# Patient Record
Sex: FEMALE | Race: WHITE | NOT HISPANIC OR LATINO | Employment: OTHER | URBAN - NONMETROPOLITAN AREA
[De-identification: names, ages, dates, MRNs, and addresses within clinical notes are randomized per-mention and may not be internally consistent; named-entity substitution may affect disease eponyms.]

---

## 2017-04-20 ENCOUNTER — LAB (OUTPATIENT)
Dept: LAB | Facility: HOSPITAL | Age: 57
End: 2017-04-20
Attending: INTERNAL MEDICINE

## 2017-04-20 ENCOUNTER — TRANSCRIBE ORDERS (OUTPATIENT)
Dept: ADMINISTRATIVE | Facility: HOSPITAL | Age: 57
End: 2017-04-20

## 2017-04-20 DIAGNOSIS — R53.83 MALAISE AND FATIGUE: ICD-10-CM

## 2017-04-20 DIAGNOSIS — E78.5 HYPERLIPIDEMIA, UNSPECIFIED HYPERLIPIDEMIA TYPE: ICD-10-CM

## 2017-04-20 DIAGNOSIS — R53.81 MALAISE AND FATIGUE: ICD-10-CM

## 2017-04-20 DIAGNOSIS — E11.9 DIABETES MELLITUS WITHOUT COMPLICATION (HCC): ICD-10-CM

## 2017-04-20 DIAGNOSIS — I10 BENIGN HYPERTENSION: ICD-10-CM

## 2017-04-20 DIAGNOSIS — E55.9 UNSPECIFIED VITAMIN D DEFICIENCY: Primary | ICD-10-CM

## 2017-04-20 DIAGNOSIS — E55.9 UNSPECIFIED VITAMIN D DEFICIENCY: ICD-10-CM

## 2017-04-20 LAB
25(OH)D3 SERPL-MCNC: 46 NG/ML
ALBUMIN SERPL-MCNC: 4.3 G/DL (ref 3.5–5)
ALBUMIN/GLOB SERPL: 1.4 G/DL (ref 1.5–2.5)
ALP SERPL-CCNC: 129 U/L (ref 35–104)
ALT SERPL W P-5'-P-CCNC: 50 U/L (ref 10–36)
ANION GAP SERPL CALCULATED.3IONS-SCNC: 6.2 MMOL/L (ref 3.6–11.2)
AST SERPL-CCNC: 35 U/L (ref 10–30)
BASOPHILS # BLD AUTO: 0.03 10*3/MM3 (ref 0–0.3)
BASOPHILS NFR BLD AUTO: 0.4 % (ref 0–2)
BILIRUB SERPL-MCNC: 0.4 MG/DL (ref 0.2–1.8)
BUN BLD-MCNC: 10 MG/DL (ref 7–21)
BUN/CREAT SERPL: 13.9 (ref 7–25)
CALCIUM SPEC-SCNC: 9.8 MG/DL (ref 7.7–10)
CHLORIDE SERPL-SCNC: 105 MMOL/L (ref 99–112)
CHOLEST SERPL-MCNC: 202 MG/DL (ref 0–200)
CO2 SERPL-SCNC: 30.8 MMOL/L (ref 24.3–31.9)
CREAT BLD-MCNC: 0.72 MG/DL (ref 0.43–1.29)
DEPRECATED RDW RBC AUTO: 40.5 FL (ref 37–54)
EOSINOPHIL # BLD AUTO: 0.14 10*3/MM3 (ref 0–0.7)
EOSINOPHIL NFR BLD AUTO: 1.9 % (ref 0–5)
ERYTHROCYTE [DISTWIDTH] IN BLOOD BY AUTOMATED COUNT: 13.1 % (ref 11.5–14.5)
GFR SERPL CREATININE-BSD FRML MDRD: 83 ML/MIN/1.73
GLOBULIN UR ELPH-MCNC: 3.1 GM/DL
GLUCOSE BLD-MCNC: 244 MG/DL (ref 70–110)
HBA1C MFR BLD: 7.8 % (ref 4.5–5.7)
HCT VFR BLD AUTO: 44.6 % (ref 37–47)
HDLC SERPL-MCNC: 51 MG/DL (ref 60–100)
HGB BLD-MCNC: 13.7 G/DL (ref 12–16)
IMM GRANULOCYTES # BLD: 0.09 10*3/MM3 (ref 0–0.03)
IMM GRANULOCYTES NFR BLD: 1.2 % (ref 0–0.5)
LDLC SERPL CALC-MCNC: 105 MG/DL (ref 0–100)
LDLC/HDLC SERPL: 2.05 {RATIO}
LYMPHOCYTES # BLD AUTO: 1.5 10*3/MM3 (ref 1–3)
LYMPHOCYTES NFR BLD AUTO: 19.9 % (ref 21–51)
MCH RBC QN AUTO: 26.1 PG (ref 27–33)
MCHC RBC AUTO-ENTMCNC: 30.7 G/DL (ref 33–37)
MCV RBC AUTO: 85.1 FL (ref 80–94)
MONOCYTES # BLD AUTO: 0.63 10*3/MM3 (ref 0.1–0.9)
MONOCYTES NFR BLD AUTO: 8.4 % (ref 0–10)
NEUTROPHILS # BLD AUTO: 5.13 10*3/MM3 (ref 1.4–6.5)
NEUTROPHILS NFR BLD AUTO: 68.2 % (ref 30–70)
OSMOLALITY SERPL CALC.SUM OF ELEC: 290.2 MOSM/KG (ref 273–305)
PLATELET # BLD AUTO: 260 10*3/MM3 (ref 130–400)
PMV BLD AUTO: 11.2 FL (ref 6–10)
POTASSIUM BLD-SCNC: 4.5 MMOL/L (ref 3.5–5.3)
PROT SERPL-MCNC: 7.4 G/DL (ref 6–8)
RBC # BLD AUTO: 5.24 10*6/MM3 (ref 4.2–5.4)
SODIUM BLD-SCNC: 142 MMOL/L (ref 135–153)
TRIGL SERPL-MCNC: 231 MG/DL (ref 0–150)
VLDLC SERPL-MCNC: 46.2 MG/DL
WBC NRBC COR # BLD: 7.52 10*3/MM3 (ref 4.5–12.5)

## 2017-04-20 PROCEDURE — 85025 COMPLETE CBC W/AUTO DIFF WBC: CPT

## 2017-04-20 PROCEDURE — 80053 COMPREHEN METABOLIC PANEL: CPT

## 2017-04-20 PROCEDURE — 80061 LIPID PANEL: CPT

## 2017-04-20 PROCEDURE — 36415 COLL VENOUS BLD VENIPUNCTURE: CPT

## 2017-04-20 PROCEDURE — 83036 HEMOGLOBIN GLYCOSYLATED A1C: CPT

## 2017-04-20 PROCEDURE — 82306 VITAMIN D 25 HYDROXY: CPT

## 2017-07-12 ENCOUNTER — TRANSCRIBE ORDERS (OUTPATIENT)
Dept: ADMINISTRATIVE | Facility: HOSPITAL | Age: 57
End: 2017-07-12

## 2017-07-12 DIAGNOSIS — Z12.31 VISIT FOR SCREENING MAMMOGRAM: Primary | ICD-10-CM

## 2017-07-31 ENCOUNTER — LAB (OUTPATIENT)
Dept: LAB | Facility: HOSPITAL | Age: 57
End: 2017-07-31
Attending: INTERNAL MEDICINE

## 2017-07-31 ENCOUNTER — TRANSCRIBE ORDERS (OUTPATIENT)
Dept: ADMINISTRATIVE | Facility: HOSPITAL | Age: 57
End: 2017-07-31

## 2017-07-31 DIAGNOSIS — R53.81 MALAISE AND FATIGUE: ICD-10-CM

## 2017-07-31 DIAGNOSIS — R79.89 OTHER ABNORMAL BLOOD CHEMISTRY: Primary | ICD-10-CM

## 2017-07-31 DIAGNOSIS — R79.89 OTHER ABNORMAL BLOOD CHEMISTRY: ICD-10-CM

## 2017-07-31 DIAGNOSIS — I10 BENIGN HYPERTENSION: ICD-10-CM

## 2017-07-31 DIAGNOSIS — R53.83 MALAISE AND FATIGUE: ICD-10-CM

## 2017-07-31 DIAGNOSIS — E11.9 DIABETES MELLITUS WITHOUT COMPLICATION (HCC): ICD-10-CM

## 2017-07-31 DIAGNOSIS — E78.5 HYPERLIPIDEMIA, UNSPECIFIED HYPERLIPIDEMIA TYPE: ICD-10-CM

## 2017-07-31 DIAGNOSIS — E55.9 UNSPECIFIED VITAMIN D DEFICIENCY: ICD-10-CM

## 2017-07-31 LAB
ALBUMIN SERPL-MCNC: 4.5 G/DL (ref 3.5–5)
ALBUMIN/GLOB SERPL: 1.4 G/DL (ref 1.5–2.5)
ALP SERPL-CCNC: 118 U/L (ref 35–104)
ALT SERPL W P-5'-P-CCNC: 35 U/L (ref 10–36)
ANION GAP SERPL CALCULATED.3IONS-SCNC: 2.9 MMOL/L (ref 3.6–11.2)
AST SERPL-CCNC: 24 U/L (ref 10–30)
BILIRUB SERPL-MCNC: 0.3 MG/DL (ref 0.2–1.8)
BUN BLD-MCNC: 15 MG/DL (ref 7–21)
BUN/CREAT SERPL: 22.1 (ref 7–25)
CALCIUM SPEC-SCNC: 9.8 MG/DL (ref 7.7–10)
CHLORIDE SERPL-SCNC: 109 MMOL/L (ref 99–112)
CHOLEST SERPL-MCNC: 156 MG/DL (ref 0–200)
CO2 SERPL-SCNC: 30.1 MMOL/L (ref 24.3–31.9)
CREAT BLD-MCNC: 0.68 MG/DL (ref 0.43–1.29)
GFR SERPL CREATININE-BSD FRML MDRD: 89 ML/MIN/1.73
GLOBULIN UR ELPH-MCNC: 3.3 GM/DL
GLUCOSE BLD-MCNC: 159 MG/DL (ref 70–110)
HBV SURFACE AB SER RIA-ACNC: NORMAL
HBV SURFACE AG SERPL QL IA: NORMAL
HCV AB SER DONR QL: NORMAL
HDLC SERPL-MCNC: 44 MG/DL (ref 60–100)
LDLC SERPL CALC-MCNC: 80 MG/DL (ref 0–100)
LDLC/HDLC SERPL: 1.81 {RATIO}
OSMOLALITY SERPL CALC.SUM OF ELEC: 287.3 MOSM/KG (ref 273–305)
POTASSIUM BLD-SCNC: 4.2 MMOL/L (ref 3.5–5.3)
PROT SERPL-MCNC: 7.8 G/DL (ref 6–8)
SODIUM BLD-SCNC: 142 MMOL/L (ref 135–153)
TRIGL SERPL-MCNC: 162 MG/DL (ref 0–150)
VLDLC SERPL-MCNC: 32.4 MG/DL

## 2017-07-31 PROCEDURE — 84681 ASSAY OF C-PEPTIDE: CPT | Performed by: INTERNAL MEDICINE

## 2017-07-31 PROCEDURE — 86803 HEPATITIS C AB TEST: CPT | Performed by: INTERNAL MEDICINE

## 2017-07-31 PROCEDURE — 86706 HEP B SURFACE ANTIBODY: CPT | Performed by: INTERNAL MEDICINE

## 2017-07-31 PROCEDURE — 80061 LIPID PANEL: CPT | Performed by: INTERNAL MEDICINE

## 2017-07-31 PROCEDURE — 80053 COMPREHEN METABOLIC PANEL: CPT | Performed by: INTERNAL MEDICINE

## 2017-07-31 PROCEDURE — 87340 HEPATITIS B SURFACE AG IA: CPT | Performed by: INTERNAL MEDICINE

## 2017-07-31 PROCEDURE — 36415 COLL VENOUS BLD VENIPUNCTURE: CPT

## 2017-08-01 LAB — C PEPTIDE SERPL-MCNC: 3.8 NG/ML (ref 1.1–4.4)

## 2017-08-08 ENCOUNTER — TRANSCRIBE ORDERS (OUTPATIENT)
Dept: ADMINISTRATIVE | Facility: HOSPITAL | Age: 57
End: 2017-08-08

## 2017-08-08 DIAGNOSIS — R79.89 ABNORMAL LFTS: Primary | ICD-10-CM

## 2017-08-08 DIAGNOSIS — R10.9 ABDOMINAL PAIN IN FEMALE: ICD-10-CM

## 2017-08-24 ENCOUNTER — HOSPITAL ENCOUNTER (OUTPATIENT)
Dept: CT IMAGING | Facility: HOSPITAL | Age: 57
Discharge: HOME | End: 2017-08-24
Attending: INTERNAL MEDICINE | Admitting: INTERNAL MEDICINE

## 2017-08-24 DIAGNOSIS — R79.89 ABNORMAL LFTS: ICD-10-CM

## 2017-08-24 DIAGNOSIS — R10.9 ABDOMINAL PAIN IN FEMALE: ICD-10-CM

## 2017-08-24 LAB — CREAT BLDA-MCNC: 0.6 MG/DL (ref 0.6–1.3)

## 2017-08-24 PROCEDURE — 74177 CT ABD & PELVIS W/CONTRAST: CPT

## 2017-08-24 PROCEDURE — 74177 CT ABD & PELVIS W/CONTRAST: CPT | Performed by: RADIOLOGY

## 2017-08-24 PROCEDURE — 82565 ASSAY OF CREATININE: CPT

## 2017-08-24 PROCEDURE — 0 IOPAMIDOL 61 % SOLUTION: Performed by: INTERNAL MEDICINE

## 2017-08-24 RX ADMIN — IOPAMIDOL 100 ML: 612 INJECTION, SOLUTION INTRAVENOUS at 08:35

## 2017-09-06 ENCOUNTER — HOSPITAL ENCOUNTER (OUTPATIENT)
Dept: MAMMOGRAPHY | Facility: HOSPITAL | Age: 57
Discharge: HOME | End: 2017-09-06
Admitting: INTERNAL MEDICINE

## 2017-09-06 DIAGNOSIS — Z12.31 VISIT FOR SCREENING MAMMOGRAM: ICD-10-CM

## 2017-09-06 PROCEDURE — 77063 BREAST TOMOSYNTHESIS BI: CPT | Performed by: RADIOLOGY

## 2017-09-06 PROCEDURE — G0202 SCR MAMMO BI INCL CAD: HCPCS

## 2017-09-06 PROCEDURE — 77063 BREAST TOMOSYNTHESIS BI: CPT

## 2017-09-06 PROCEDURE — 77067 SCR MAMMO BI INCL CAD: CPT | Performed by: RADIOLOGY

## 2017-12-07 ENCOUNTER — TRANSCRIBE ORDERS (OUTPATIENT)
Dept: ADMINISTRATIVE | Facility: HOSPITAL | Age: 57
End: 2017-12-07

## 2017-12-07 ENCOUNTER — LAB (OUTPATIENT)
Dept: LAB | Facility: HOSPITAL | Age: 57
End: 2017-12-07
Attending: INTERNAL MEDICINE

## 2017-12-07 DIAGNOSIS — R53.81 MALAISE AND FATIGUE: ICD-10-CM

## 2017-12-07 DIAGNOSIS — E78.5 DYSLIPIDEMIA: ICD-10-CM

## 2017-12-07 DIAGNOSIS — R53.83 MALAISE AND FATIGUE: ICD-10-CM

## 2017-12-07 DIAGNOSIS — I10 BENIGN HYPERTENSION: ICD-10-CM

## 2017-12-07 DIAGNOSIS — E11.69 DIABETES MELLITUS ASSOCIATED WITH HORMONAL ETIOLOGY (HCC): ICD-10-CM

## 2017-12-07 DIAGNOSIS — E11.69 DIABETES MELLITUS ASSOCIATED WITH HORMONAL ETIOLOGY (HCC): Primary | ICD-10-CM

## 2017-12-07 LAB
ALBUMIN SERPL-MCNC: 4.5 G/DL (ref 3.5–5)
ALBUMIN/GLOB SERPL: 1.5 G/DL (ref 1.5–2.5)
ALP SERPL-CCNC: 120 U/L (ref 35–104)
ALT SERPL W P-5'-P-CCNC: 28 U/L (ref 10–36)
ANION GAP SERPL CALCULATED.3IONS-SCNC: 8.9 MMOL/L (ref 3.6–11.2)
AST SERPL-CCNC: 18 U/L (ref 10–30)
BASOPHILS # BLD AUTO: 0.02 10*3/MM3 (ref 0–0.3)
BASOPHILS NFR BLD AUTO: 0.3 % (ref 0–2)
BILIRUB SERPL-MCNC: 0.4 MG/DL (ref 0.2–1.8)
BUN BLD-MCNC: 13 MG/DL (ref 7–21)
BUN/CREAT SERPL: 17.6 (ref 7–25)
CALCIUM SPEC-SCNC: 9.7 MG/DL (ref 7.7–10)
CHLORIDE SERPL-SCNC: 105 MMOL/L (ref 99–112)
CHOLEST SERPL-MCNC: 170 MG/DL (ref 0–200)
CO2 SERPL-SCNC: 29.1 MMOL/L (ref 24.3–31.9)
CREAT BLD-MCNC: 0.74 MG/DL (ref 0.43–1.29)
DEPRECATED RDW RBC AUTO: 43.1 FL (ref 37–54)
EOSINOPHIL # BLD AUTO: 0.19 10*3/MM3 (ref 0–0.7)
EOSINOPHIL NFR BLD AUTO: 2.6 % (ref 0–5)
ERYTHROCYTE [DISTWIDTH] IN BLOOD BY AUTOMATED COUNT: 14.2 % (ref 11.5–14.5)
GFR SERPL CREATININE-BSD FRML MDRD: 80 ML/MIN/1.73
GLOBULIN UR ELPH-MCNC: 3.1 GM/DL
GLUCOSE BLD-MCNC: 146 MG/DL (ref 70–110)
HBA1C MFR BLD: 7.9 % (ref 4.5–5.7)
HCT VFR BLD AUTO: 45.8 % (ref 37–47)
HDLC SERPL-MCNC: 46 MG/DL (ref 60–100)
HGB BLD-MCNC: 13.9 G/DL (ref 12–16)
IMM GRANULOCYTES # BLD: 0.05 10*3/MM3 (ref 0–0.03)
IMM GRANULOCYTES NFR BLD: 0.7 % (ref 0–0.5)
LDLC SERPL CALC-MCNC: 94 MG/DL (ref 0–100)
LDLC/HDLC SERPL: 2.04 {RATIO}
LYMPHOCYTES # BLD AUTO: 1.78 10*3/MM3 (ref 1–3)
LYMPHOCYTES NFR BLD AUTO: 24.5 % (ref 21–51)
MCH RBC QN AUTO: 25.2 PG (ref 27–33)
MCHC RBC AUTO-ENTMCNC: 30.3 G/DL (ref 33–37)
MCV RBC AUTO: 83 FL (ref 80–94)
MONOCYTES # BLD AUTO: 0.71 10*3/MM3 (ref 0.1–0.9)
MONOCYTES NFR BLD AUTO: 9.8 % (ref 0–10)
NEUTROPHILS # BLD AUTO: 4.51 10*3/MM3 (ref 1.4–6.5)
NEUTROPHILS NFR BLD AUTO: 62.1 % (ref 30–70)
OSMOLALITY SERPL CALC.SUM OF ELEC: 287.7 MOSM/KG (ref 273–305)
PLATELET # BLD AUTO: 258 10*3/MM3 (ref 130–400)
PMV BLD AUTO: 11.4 FL (ref 6–10)
POTASSIUM BLD-SCNC: 4.5 MMOL/L (ref 3.5–5.3)
PROT SERPL-MCNC: 7.6 G/DL (ref 6–8)
RBC # BLD AUTO: 5.52 10*6/MM3 (ref 4.2–5.4)
SODIUM BLD-SCNC: 143 MMOL/L (ref 135–153)
TRIGL SERPL-MCNC: 151 MG/DL (ref 0–150)
VLDLC SERPL-MCNC: 30.2 MG/DL
WBC NRBC COR # BLD: 7.26 10*3/MM3 (ref 4.5–12.5)

## 2017-12-07 PROCEDURE — 83036 HEMOGLOBIN GLYCOSYLATED A1C: CPT

## 2017-12-07 PROCEDURE — 80053 COMPREHEN METABOLIC PANEL: CPT

## 2017-12-07 PROCEDURE — 80061 LIPID PANEL: CPT

## 2017-12-07 PROCEDURE — 85025 COMPLETE CBC W/AUTO DIFF WBC: CPT

## 2017-12-07 PROCEDURE — 36415 COLL VENOUS BLD VENIPUNCTURE: CPT

## 2018-03-29 ENCOUNTER — LAB (OUTPATIENT)
Dept: LAB | Facility: HOSPITAL | Age: 58
End: 2018-03-29
Attending: INTERNAL MEDICINE

## 2018-03-29 ENCOUNTER — TRANSCRIBE ORDERS (OUTPATIENT)
Dept: ADMINISTRATIVE | Facility: HOSPITAL | Age: 58
End: 2018-03-29

## 2018-03-29 DIAGNOSIS — E78.5 HYPERLIPIDEMIA, UNSPECIFIED HYPERLIPIDEMIA TYPE: ICD-10-CM

## 2018-03-29 DIAGNOSIS — I10 BENIGN HYPERTENSION: ICD-10-CM

## 2018-03-29 DIAGNOSIS — R53.81 MALAISE AND FATIGUE: ICD-10-CM

## 2018-03-29 DIAGNOSIS — R53.83 MALAISE AND FATIGUE: ICD-10-CM

## 2018-03-29 DIAGNOSIS — E11.69 DIABETES MELLITUS ASSOCIATED WITH HORMONAL ETIOLOGY (HCC): Primary | ICD-10-CM

## 2018-03-29 DIAGNOSIS — E11.69 DIABETES MELLITUS ASSOCIATED WITH HORMONAL ETIOLOGY (HCC): ICD-10-CM

## 2018-03-29 LAB
ALBUMIN SERPL-MCNC: 4.2 G/DL (ref 3.5–5)
ALBUMIN/GLOB SERPL: 1.4 G/DL (ref 1.5–2.5)
ALP SERPL-CCNC: 116 U/L (ref 35–104)
ALT SERPL W P-5'-P-CCNC: 23 U/L (ref 10–36)
ANION GAP SERPL CALCULATED.3IONS-SCNC: 6.1 MMOL/L (ref 3.6–11.2)
AST SERPL-CCNC: 15 U/L (ref 10–30)
BASOPHILS # BLD AUTO: 0.04 10*3/MM3 (ref 0–0.3)
BASOPHILS NFR BLD AUTO: 0.5 % (ref 0–2)
BILIRUB SERPL-MCNC: 0.3 MG/DL (ref 0.2–1.8)
BUN BLD-MCNC: 16 MG/DL (ref 7–21)
BUN/CREAT SERPL: 26.7 (ref 7–25)
CALCIUM SPEC-SCNC: 9.6 MG/DL (ref 7.7–10)
CHLORIDE SERPL-SCNC: 109 MMOL/L (ref 99–112)
CHOLEST SERPL-MCNC: 168 MG/DL (ref 0–200)
CO2 SERPL-SCNC: 26.9 MMOL/L (ref 24.3–31.9)
CREAT BLD-MCNC: 0.6 MG/DL (ref 0.43–1.29)
DEPRECATED RDW RBC AUTO: 43.1 FL (ref 37–54)
EOSINOPHIL # BLD AUTO: 0.14 10*3/MM3 (ref 0–0.7)
EOSINOPHIL NFR BLD AUTO: 1.9 % (ref 0–5)
ERYTHROCYTE [DISTWIDTH] IN BLOOD BY AUTOMATED COUNT: 14.5 % (ref 11.5–14.5)
GFR SERPL CREATININE-BSD FRML MDRD: 102 ML/MIN/1.73
GLOBULIN UR ELPH-MCNC: 2.9 GM/DL
GLUCOSE BLD-MCNC: 150 MG/DL (ref 70–110)
HBA1C MFR BLD: 7.1 % (ref 4.5–5.7)
HCT VFR BLD AUTO: 45 % (ref 37–47)
HDLC SERPL-MCNC: 52 MG/DL (ref 60–100)
HGB BLD-MCNC: 14.1 G/DL (ref 12–16)
IMM GRANULOCYTES # BLD: 0.07 10*3/MM3 (ref 0–0.03)
IMM GRANULOCYTES NFR BLD: 0.9 % (ref 0–0.5)
LDLC SERPL CALC-MCNC: 78 MG/DL (ref 0–100)
LDLC/HDLC SERPL: 1.5 {RATIO}
LYMPHOCYTES # BLD AUTO: 1.96 10*3/MM3 (ref 1–3)
LYMPHOCYTES NFR BLD AUTO: 26.3 % (ref 21–51)
MCH RBC QN AUTO: 25.7 PG (ref 27–33)
MCHC RBC AUTO-ENTMCNC: 31.3 G/DL (ref 33–37)
MCV RBC AUTO: 82.1 FL (ref 80–94)
MONOCYTES # BLD AUTO: 0.65 10*3/MM3 (ref 0.1–0.9)
MONOCYTES NFR BLD AUTO: 8.7 % (ref 0–10)
NEUTROPHILS # BLD AUTO: 4.58 10*3/MM3 (ref 1.4–6.5)
NEUTROPHILS NFR BLD AUTO: 61.7 % (ref 30–70)
OSMOLALITY SERPL CALC.SUM OF ELEC: 287.2 MOSM/KG (ref 273–305)
PLATELET # BLD AUTO: 256 10*3/MM3 (ref 130–400)
PMV BLD AUTO: 10.8 FL (ref 6–10)
POTASSIUM BLD-SCNC: 4.3 MMOL/L (ref 3.5–5.3)
PROT SERPL-MCNC: 7.1 G/DL (ref 6–8)
RBC # BLD AUTO: 5.48 10*6/MM3 (ref 4.2–5.4)
SODIUM BLD-SCNC: 142 MMOL/L (ref 135–153)
TRIGL SERPL-MCNC: 190 MG/DL (ref 0–150)
VLDLC SERPL-MCNC: 38 MG/DL
WBC NRBC COR # BLD: 7.44 10*3/MM3 (ref 4.5–12.5)

## 2018-03-29 PROCEDURE — 83036 HEMOGLOBIN GLYCOSYLATED A1C: CPT

## 2018-03-29 PROCEDURE — 80053 COMPREHEN METABOLIC PANEL: CPT

## 2018-03-29 PROCEDURE — 36415 COLL VENOUS BLD VENIPUNCTURE: CPT

## 2018-03-29 PROCEDURE — 85025 COMPLETE CBC W/AUTO DIFF WBC: CPT

## 2018-03-29 PROCEDURE — 80061 LIPID PANEL: CPT

## 2018-08-09 ENCOUNTER — LAB (OUTPATIENT)
Dept: LAB | Facility: HOSPITAL | Age: 58
End: 2018-08-09
Attending: INTERNAL MEDICINE

## 2018-08-09 ENCOUNTER — TRANSCRIBE ORDERS (OUTPATIENT)
Dept: ADMINISTRATIVE | Facility: HOSPITAL | Age: 58
End: 2018-08-09

## 2018-08-09 DIAGNOSIS — M25.50 PAIN IN JOINT, MULTIPLE SITES: ICD-10-CM

## 2018-08-09 DIAGNOSIS — E78.5 HYPERLIPIDEMIA, UNSPECIFIED HYPERLIPIDEMIA TYPE: ICD-10-CM

## 2018-08-09 DIAGNOSIS — R94.5 LIVER FUNCTION STUDY, ABNORMAL: ICD-10-CM

## 2018-08-09 DIAGNOSIS — E11.69 DIABETES MELLITUS ASSOCIATED WITH HORMONAL ETIOLOGY (HCC): ICD-10-CM

## 2018-08-09 DIAGNOSIS — I10 BENIGN HYPERTENSION: Primary | ICD-10-CM

## 2018-08-09 DIAGNOSIS — I10 BENIGN HYPERTENSION: ICD-10-CM

## 2018-08-09 DIAGNOSIS — E55.9 VITAMIN D DEFICIENCY: ICD-10-CM

## 2018-08-09 LAB
25(OH)D3 SERPL-MCNC: 79 NG/ML
ALBUMIN UR-MCNC: 3.8 MG/L
CHOLEST SERPL-MCNC: 163 MG/DL (ref 0–200)
CK SERPL-CCNC: 38 U/L (ref 24–173)
HBA1C MFR BLD: 7.5 % (ref 4.5–5.7)
HDLC SERPL-MCNC: 48 MG/DL (ref 60–100)
LDLC SERPL CALC-MCNC: 74 MG/DL (ref 0–100)
LDLC/HDLC SERPL: 1.53 {RATIO}
TRIGL SERPL-MCNC: 207 MG/DL (ref 0–150)
VLDLC SERPL-MCNC: 41.4 MG/DL

## 2018-08-09 PROCEDURE — 80061 LIPID PANEL: CPT

## 2018-08-09 PROCEDURE — 82306 VITAMIN D 25 HYDROXY: CPT

## 2018-08-09 PROCEDURE — 84681 ASSAY OF C-PEPTIDE: CPT

## 2018-08-09 PROCEDURE — 36415 COLL VENOUS BLD VENIPUNCTURE: CPT

## 2018-08-09 PROCEDURE — 83036 HEMOGLOBIN GLYCOSYLATED A1C: CPT

## 2018-08-09 PROCEDURE — 84075 ASSAY ALKALINE PHOSPHATASE: CPT

## 2018-08-09 PROCEDURE — 84080 ASSAY ALKALINE PHOSPHATASES: CPT

## 2018-08-09 PROCEDURE — 82043 UR ALBUMIN QUANTITATIVE: CPT

## 2018-08-09 PROCEDURE — 82550 ASSAY OF CK (CPK): CPT

## 2018-08-10 LAB — C PEPTIDE SERPL-MCNC: 3.8 NG/ML (ref 1.1–4.4)

## 2018-08-13 ENCOUNTER — TRANSCRIBE ORDERS (OUTPATIENT)
Dept: PULMONOLOGY | Facility: HOSPITAL | Age: 58
End: 2018-08-13

## 2018-08-13 DIAGNOSIS — Z12.31 VISIT FOR SCREENING MAMMOGRAM: Primary | ICD-10-CM

## 2018-08-13 LAB
ALP BONE CFR SERPL: 36 % (ref 14–68)
ALP INTEST CFR SERPL: 21 % (ref 0–18)
ALP LIVER CFR SERPL: 43 % (ref 18–85)
ALP SERPL-CCNC: 105 IU/L (ref 39–117)

## 2018-08-23 ENCOUNTER — TRANSCRIBE ORDERS (OUTPATIENT)
Dept: ADMINISTRATIVE | Facility: HOSPITAL | Age: 58
End: 2018-08-23

## 2018-08-23 DIAGNOSIS — R10.9 ABDOMINAL PAIN IN FEMALE: ICD-10-CM

## 2018-08-23 DIAGNOSIS — R94.5 ABNORMAL RESULTS OF LIVER FUNCTION STUDIES: Primary | ICD-10-CM

## 2018-08-30 ENCOUNTER — TRANSCRIBE ORDERS (OUTPATIENT)
Dept: PULMONOLOGY | Facility: HOSPITAL | Age: 58
End: 2018-08-30

## 2018-08-30 ENCOUNTER — HOSPITAL ENCOUNTER (OUTPATIENT)
Dept: ULTRASOUND IMAGING | Facility: HOSPITAL | Age: 58
Discharge: HOME | End: 2018-08-30
Attending: INTERNAL MEDICINE

## 2018-08-30 ENCOUNTER — LAB (OUTPATIENT)
Dept: LAB | Facility: HOSPITAL | Age: 58
End: 2018-08-30
Attending: INTERNAL MEDICINE

## 2018-08-30 ENCOUNTER — HOSPITAL ENCOUNTER (OUTPATIENT)
Dept: NUCLEAR MEDICINE | Facility: HOSPITAL | Age: 58
Discharge: HOME | End: 2018-08-30
Attending: INTERNAL MEDICINE

## 2018-08-30 DIAGNOSIS — R10.9 ABDOMINAL PAIN, UNSPECIFIED ABDOMINAL LOCATION: Primary | ICD-10-CM

## 2018-08-30 DIAGNOSIS — R10.9 ABDOMINAL PAIN IN FEMALE: ICD-10-CM

## 2018-08-30 DIAGNOSIS — R94.5 ABNORMAL RESULTS OF LIVER FUNCTION STUDIES: ICD-10-CM

## 2018-08-30 DIAGNOSIS — R10.9 ABDOMINAL PAIN, UNSPECIFIED ABDOMINAL LOCATION: ICD-10-CM

## 2018-08-30 LAB
AMYLASE SERPL-CCNC: 29 U/L (ref 28–100)
LIPASE SERPL-CCNC: 30 U/L (ref 13–60)

## 2018-08-30 PROCEDURE — 76705 ECHO EXAM OF ABDOMEN: CPT

## 2018-08-30 PROCEDURE — 36415 COLL VENOUS BLD VENIPUNCTURE: CPT

## 2018-08-30 PROCEDURE — 83690 ASSAY OF LIPASE: CPT

## 2018-08-30 PROCEDURE — 76705 ECHO EXAM OF ABDOMEN: CPT | Performed by: RADIOLOGY

## 2018-08-30 PROCEDURE — 82150 ASSAY OF AMYLASE: CPT

## 2018-09-01 ENCOUNTER — HOSPITAL ENCOUNTER (OUTPATIENT)
Dept: NUCLEAR MEDICINE | Facility: HOSPITAL | Age: 58
Discharge: HOME | End: 2018-09-01
Attending: INTERNAL MEDICINE

## 2018-09-01 DIAGNOSIS — R10.9 ABDOMINAL PAIN IN FEMALE: ICD-10-CM

## 2018-09-01 PROCEDURE — 0 TECHNETIUM TC 99M MEBROFENIN KIT: Performed by: INTERNAL MEDICINE

## 2018-09-01 PROCEDURE — 78226 HEPATOBILIARY SYSTEM IMAGING: CPT

## 2018-09-01 PROCEDURE — A9537 TC99M MEBROFENIN: HCPCS | Performed by: INTERNAL MEDICINE

## 2018-09-01 PROCEDURE — 78226 HEPATOBILIARY SYSTEM IMAGING: CPT | Performed by: RADIOLOGY

## 2018-09-01 RX ORDER — KIT FOR THE PREPARATION OF TECHNETIUM TC 99M MEBROFENIN 45 MG/10ML
1 INJECTION, POWDER, LYOPHILIZED, FOR SOLUTION INTRAVENOUS
Status: COMPLETED | OUTPATIENT
Start: 2018-09-01 | End: 2018-09-01

## 2018-09-01 RX ADMIN — MEBROFENIN 1 DOSE: 45 INJECTION, POWDER, LYOPHILIZED, FOR SOLUTION INTRAVENOUS at 12:20

## 2018-09-02 ENCOUNTER — APPOINTMENT (OUTPATIENT)
Dept: NUCLEAR MEDICINE | Facility: HOSPITAL | Age: 58
End: 2018-09-02
Attending: INTERNAL MEDICINE

## 2018-09-05 ENCOUNTER — OFFICE VISIT (OUTPATIENT)
Dept: SURGERY | Facility: CLINIC | Age: 58
End: 2018-09-05

## 2018-09-05 VITALS
BODY MASS INDEX: 30 KG/M2 | DIASTOLIC BLOOD PRESSURE: 83 MMHG | HEART RATE: 85 BPM | WEIGHT: 163 LBS | SYSTOLIC BLOOD PRESSURE: 137 MMHG | HEIGHT: 62 IN

## 2018-09-05 DIAGNOSIS — K76.0 FATTY LIVER: Primary | ICD-10-CM

## 2018-09-05 DIAGNOSIS — K82.8 BILIARY DYSKINESIA: ICD-10-CM

## 2018-09-05 PROCEDURE — 99243 OFF/OP CNSLTJ NEW/EST LOW 30: CPT | Performed by: SURGERY

## 2018-09-05 RX ORDER — PANTOPRAZOLE SODIUM 40 MG/1
TABLET, DELAYED RELEASE ORAL DAILY
COMMUNITY
Start: 2018-08-23

## 2018-09-05 RX ORDER — LOSARTAN POTASSIUM 50 MG/1
TABLET ORAL DAILY
COMMUNITY
Start: 2018-08-23

## 2018-09-05 RX ORDER — SIMVASTATIN 10 MG
TABLET ORAL DAILY
COMMUNITY
Start: 2018-08-23

## 2018-09-05 RX ORDER — ALOGLIPTIN AND METFORMIN HYDROCHLORIDE 12.5; 5 MG/1; MG/1
TABLET, FILM COATED ORAL 2 TIMES DAILY
COMMUNITY
Start: 2018-08-23

## 2018-09-05 NOTE — PROGRESS NOTES
Subjective   Perfecto Dozier is a 58 y.o. female is being seen for consultation today at the request of Heri Ramires MD    Perfecto Dozier is a 58 y.o. female58-year-old female with intermittent right upper quadrant pain after fatty and greasy meals.  Pain radiates to the back.  Nausea but no vomiting reported.  No diarrhea.  Ejection fraction of 17% on HIDA scan with reproduction of symptoms.  Previous surgical history includes hysterectomy, bilateral salpingo-oophorectomy, and tubal ligation        History reviewed. No pertinent past medical history.    Family History   Problem Relation Age of Onset   • Breast cancer Mother 58   • Breast cancer Maternal Aunt 62   • Ovarian cancer Neg Hx        Social History     Social History   • Marital status:      Spouse name: N/A   • Number of children: N/A   • Years of education: N/A     Occupational History   • Not on file.     Social History Main Topics   • Smoking status: Never Smoker   • Smokeless tobacco: Never Used   • Alcohol use No   • Drug use: No   • Sexual activity: Defer     Other Topics Concern   • Not on file     Social History Narrative   • No narrative on file       Past Surgical History:   Procedure Laterality Date   • HYSTERECTOMY  01/2015    Complete   • MASS EXCISION     • OOPHORECTOMY Bilateral 01/2015    Complete   • TUBAL ABDOMINAL LIGATION         Review of Systems   Constitutional: Negative for activity change, appetite change, chills and fever.   HENT: Negative for sore throat and trouble swallowing.    Eyes: Negative for visual disturbance.   Respiratory: Negative for cough and shortness of breath.    Cardiovascular: Negative for chest pain and palpitations.   Gastrointestinal: Positive for abdominal pain and nausea. Negative for abdominal distention, blood in stool, constipation, diarrhea and vomiting.   Endocrine: Negative for cold intolerance and heat intolerance.   Genitourinary: Negative for dysuria.   Musculoskeletal: Negative for joint  "swelling.   Skin: Negative for color change, rash and wound.   Allergic/Immunologic: Negative for immunocompromised state.   Neurological: Negative for dizziness, seizures, weakness and headaches.   Hematological: Negative for adenopathy. Does not bruise/bleed easily.   Psychiatric/Behavioral: Negative for agitation and confusion.         /83   Pulse 85   Ht 157.5 cm (62\")   Wt 73.9 kg (163 lb)   LMP  (LMP Unknown)   BMI 29.81 kg/m²   Objective   Physical Exam   Constitutional: She is oriented to person, place, and time. She appears well-developed.   HENT:   Head: Normocephalic and atraumatic.   Mouth/Throat: Mucous membranes are normal.   Eyes: Pupils are equal, round, and reactive to light. Conjunctivae are normal.   Neck: Neck supple. No JVD present. No tracheal deviation present. No thyromegaly present.   Cardiovascular: Normal rate and regular rhythm.  Exam reveals no gallop and no friction rub.    No murmur heard.  Pulmonary/Chest: Effort normal and breath sounds normal.   Abdominal: Soft. She exhibits no distension. There is no splenomegaly or hepatomegaly. There is no tenderness. No hernia.   Musculoskeletal: Normal range of motion. She exhibits no deformity.   Neurological: She is alert and oriented to person, place, and time.   Skin: Skin is warm and dry.   Psychiatric: She has a normal mood and affect.         Perfecto was seen today for gallbladder dysfunction and fatty liver.    Diagnoses and all orders for this visit:    Fatty liver  -     Case Request; Standing  -     CBC and Differential; Future  -     Comprehensive metabolic panel; Future  -     metroNIDAZOLE (FLAGYL) IVPB 500 mg; Infuse 100 mL into a venous catheter 1 (One) Time.  -     Case Request    Biliary dyskinesia  -     Case Request; Standing  -     CBC and Differential; Future  -     Comprehensive metabolic panel; Future  -     metroNIDAZOLE (FLAGYL) IVPB 500 mg; Infuse 100 mL into a venous catheter 1 (One) Time.  -     Case " Request    Other orders  -     Provide instructions to patient on NPO status  -     Obtain informed consent  -     Clorhexidine skin prep  -     Follow Anesthesia Guidelines / Standing Orders; Standing  -     Verify NPO Status; Standing  -     Obtain informed consent; Standing  -     SCD (sequential compression device)- to be placed on patient in Pre-op; Standing  -     Instructions on coughing, deep breathing, and incentive spirometry.; Standing        Assessment     Perfecto Dozier is a 58 y.o. female with biliary dyskinesia need for cholecystectomy.  She is aware the risks and benefits of surgery and is willing to proceed.    Patient's Body mass index is 29.81 kg/m². BMI is above normal parameters. Recommendations include: educational material.

## 2018-09-06 ENCOUNTER — TELEPHONE (OUTPATIENT)
Dept: SURGERY | Facility: CLINIC | Age: 58
End: 2018-09-06

## 2018-09-06 NOTE — TELEPHONE ENCOUNTER
Left patient a voicemail to contact me back at the office regarding her surgery.    BC 9/6/18 @ 11:44am

## 2018-09-10 ENCOUNTER — HOSPITAL ENCOUNTER (OUTPATIENT)
Dept: MAMMOGRAPHY | Facility: HOSPITAL | Age: 58
Discharge: HOME | End: 2018-09-10
Admitting: INTERNAL MEDICINE

## 2018-09-10 ENCOUNTER — RESULTS ENCOUNTER (OUTPATIENT)
Dept: SURGERY | Facility: CLINIC | Age: 58
End: 2018-09-10

## 2018-09-10 DIAGNOSIS — K82.8 BILIARY DYSKINESIA: ICD-10-CM

## 2018-09-10 DIAGNOSIS — K76.0 FATTY LIVER: ICD-10-CM

## 2018-09-10 DIAGNOSIS — Z12.31 VISIT FOR SCREENING MAMMOGRAM: ICD-10-CM

## 2018-09-10 PROCEDURE — 77067 SCR MAMMO BI INCL CAD: CPT | Performed by: RADIOLOGY

## 2018-09-10 PROCEDURE — 77067 SCR MAMMO BI INCL CAD: CPT

## 2018-09-10 PROCEDURE — 77063 BREAST TOMOSYNTHESIS BI: CPT

## 2018-09-10 PROCEDURE — 77063 BREAST TOMOSYNTHESIS BI: CPT | Performed by: RADIOLOGY

## 2018-09-12 ENCOUNTER — APPOINTMENT (OUTPATIENT)
Dept: PREADMISSION TESTING | Facility: HOSPITAL | Age: 58
End: 2018-09-12

## 2018-09-12 VITALS — BODY MASS INDEX: 29.63 KG/M2 | WEIGHT: 162 LBS

## 2018-09-12 DIAGNOSIS — Z01.818 PRE-OP EXAM: Primary | ICD-10-CM

## 2018-09-12 LAB
ALBUMIN SERPL-MCNC: 4.6 G/DL (ref 3.4–4.8)
ALBUMIN/GLOB SERPL: 1.5 G/DL (ref 1.5–2.5)
ALP SERPL-CCNC: 118 U/L (ref 35–104)
ALT SERPL W P-5'-P-CCNC: 19 U/L (ref 10–36)
ANION GAP SERPL CALCULATED.3IONS-SCNC: 10.6 MMOL/L (ref 3.6–11.2)
AST SERPL-CCNC: 17 U/L (ref 10–30)
BASOPHILS # BLD AUTO: 0.05 10*3/MM3 (ref 0–0.3)
BASOPHILS NFR BLD AUTO: 0.5 % (ref 0–2)
BILIRUB SERPL-MCNC: 0.4 MG/DL (ref 0.2–1.8)
BUN BLD-MCNC: 14 MG/DL (ref 7–21)
BUN/CREAT SERPL: 20 (ref 7–25)
CALCIUM SPEC-SCNC: 9.4 MG/DL (ref 7.7–10)
CHLORIDE SERPL-SCNC: 103 MMOL/L (ref 99–112)
CO2 SERPL-SCNC: 27.4 MMOL/L (ref 24.3–31.9)
CREAT BLD-MCNC: 0.7 MG/DL (ref 0.43–1.29)
DEPRECATED RDW RBC AUTO: 41.9 FL (ref 37–54)
EOSINOPHIL # BLD AUTO: 0.14 10*3/MM3 (ref 0–0.7)
EOSINOPHIL NFR BLD AUTO: 1.4 % (ref 0–5)
ERYTHROCYTE [DISTWIDTH] IN BLOOD BY AUTOMATED COUNT: 14.3 % (ref 11.5–14.5)
GFR SERPL CREATININE-BSD FRML MDRD: 85 ML/MIN/1.73
GLOBULIN UR ELPH-MCNC: 3 GM/DL
GLUCOSE BLD-MCNC: 208 MG/DL (ref 70–110)
HCT VFR BLD AUTO: 44.5 % (ref 37–47)
HGB BLD-MCNC: 14.1 G/DL (ref 12–16)
IMM GRANULOCYTES # BLD: 0.05 10*3/MM3 (ref 0–0.03)
IMM GRANULOCYTES NFR BLD: 0.5 % (ref 0–0.5)
LYMPHOCYTES # BLD AUTO: 2.92 10*3/MM3 (ref 1–3)
LYMPHOCYTES NFR BLD AUTO: 29.7 % (ref 21–51)
MCH RBC QN AUTO: 25.6 PG (ref 27–33)
MCHC RBC AUTO-ENTMCNC: 31.7 G/DL (ref 33–37)
MCV RBC AUTO: 80.9 FL (ref 80–94)
MONOCYTES # BLD AUTO: 0.71 10*3/MM3 (ref 0.1–0.9)
MONOCYTES NFR BLD AUTO: 7.2 % (ref 0–10)
NEUTROPHILS # BLD AUTO: 5.96 10*3/MM3 (ref 1.4–6.5)
NEUTROPHILS NFR BLD AUTO: 60.7 % (ref 30–70)
OSMOLALITY SERPL CALC.SUM OF ELEC: 287.8 MOSM/KG (ref 273–305)
PLATELET # BLD AUTO: 277 10*3/MM3 (ref 130–400)
PMV BLD AUTO: 12.3 FL (ref 6–10)
POTASSIUM BLD-SCNC: 4.1 MMOL/L (ref 3.5–5.3)
PROT SERPL-MCNC: 7.6 G/DL (ref 6–8)
RBC # BLD AUTO: 5.5 10*6/MM3 (ref 4.2–5.4)
SODIUM BLD-SCNC: 141 MMOL/L (ref 135–153)
WBC NRBC COR # BLD: 9.83 10*3/MM3 (ref 4.5–12.5)

## 2018-09-12 PROCEDURE — 93005 ELECTROCARDIOGRAM TRACING: CPT | Performed by: SURGERY

## 2018-09-12 PROCEDURE — 36415 COLL VENOUS BLD VENIPUNCTURE: CPT | Performed by: SURGERY

## 2018-09-12 PROCEDURE — 85025 COMPLETE CBC W/AUTO DIFF WBC: CPT | Performed by: SURGERY

## 2018-09-12 PROCEDURE — 93010 ELECTROCARDIOGRAM REPORT: CPT | Performed by: INTERNAL MEDICINE

## 2018-09-12 PROCEDURE — 80053 COMPREHEN METABOLIC PANEL: CPT | Performed by: SURGERY

## 2018-09-12 NOTE — DISCHARGE INSTRUCTIONS
TAKE the following medications the morning of surgery:  All heart or blood pressure medications    HOLD all diabetic medications the morning of surgery as ordered by physician.    Please discontinue all blood thinners and anticoagulants (except aspirin) prior to surgery as per your surgeon and cardiologist instructions.  Aspirin may be continued up to the day prior to surgery.     CHLORHEXIDINE CLOTHS GIVEN WITH INSTRUCTIONS AND FORM TO RETURN TO HOSPITAL    General Instructions:  · Do not eat or drink after midnight: includes water, mints, or gum. You may brush your teeth.  Dental appliances that are removable must be taken out day of surgery.  · Do not smoke, chew tobacco, or drink alcohol.  · Bring medications in original bottles, any inhalers and if applicable your C-PAP/BI-PAP machine.  · Bring any papers given to you in the doctor's office.  · Wear clean comfortable clothes and socks.  · Do not wear contact lenses or make-up. Bring a case for your glasses if applicable.  · Bring crutches or walker if applicable.  · Leave all other valuables and jewelry at home.    If you were given a blood bank ID arm band remember to bring it with you the day of surgery.    Preventing a Surgical Site Infection:  Shower the night before surgery (unless instructed other wise) using a fresh bar of anti-bacterial soap (such as Dial) and clean washcloth. Dry with a clean towel and dress in clean clothing.  For 2 to 3 days before surgery, avoid shaving with a razor near where you will have surgery because the razor can irritate skin and make it easier to develop an infection. Ask your surgeon if you will be receiving antibiotics prior to surgery.  Make sure you, your family, and all healthcare providers clear their hands with soap and water or an alcohol-based hand  before caring for you or your wound.  If at all possible, quit smoking as many days before surgery as you can.    Day of surgery:  Upon arrival, a Pre-op nurse  and Anesthesiologist will review your health history, obtain vital signs, and answer questions you may have. The only belongings needed at this time will be your home medications and if applicable your C-PAP/BI-PAP machine. If you are staying overnight your family can leave the rest of your belongings in the car and bring them to your room later. A Pre-op nurse will start an IV and you may receive medication in preparation for surgery, including something to help you relax. Your family will be able to see you in the Pre-op area. While you are in surgery your family should notify the waiting room  if they leave the waiting room area and provide a contact phone number.    Please be aware that surgery does come with discomfort. We want to make every effort to control your discomfort so please discuss any uncontrolled symptoms with your nurse. Your doctor will most likely have prescribed pain medications.    If you are going home after surgery you will receive individualized written care instructions before being discharged. A responsible adult must drive you to and from the hospital on the day of surgery and stay with you for 24 hours.    If you are staying overnight following surgery, you will be transported to your hospital room following the recovery period.  [facility].    If you have any questions please call Pre-Admission Testing at [phone].  Deductibles and co-payments are collected on the day of service. Please be prepared to pay the required co-pay, deductible or deposit on the day of service as defined by your plan.

## 2018-09-14 ENCOUNTER — ANESTHESIA EVENT (OUTPATIENT)
Dept: PERIOP | Facility: HOSPITAL | Age: 58
End: 2018-09-14

## 2018-09-14 ENCOUNTER — ANESTHESIA (OUTPATIENT)
Dept: PERIOP | Facility: HOSPITAL | Age: 58
End: 2018-09-14

## 2018-09-14 ENCOUNTER — HOSPITAL ENCOUNTER (OUTPATIENT)
Facility: HOSPITAL | Age: 58
Setting detail: HOSPITAL OUTPATIENT SURGERY
Discharge: HOME | End: 2018-09-14
Attending: SURGERY | Admitting: SURGERY

## 2018-09-14 VITALS
OXYGEN SATURATION: 95 % | DIASTOLIC BLOOD PRESSURE: 89 MMHG | WEIGHT: 162 LBS | SYSTOLIC BLOOD PRESSURE: 141 MMHG | HEIGHT: 63 IN | TEMPERATURE: 97.9 F | HEART RATE: 76 BPM | BODY MASS INDEX: 28.7 KG/M2 | RESPIRATION RATE: 18 BRPM

## 2018-09-14 DIAGNOSIS — K76.0 FATTY LIVER: ICD-10-CM

## 2018-09-14 DIAGNOSIS — K82.8 BILIARY DYSKINESIA: ICD-10-CM

## 2018-09-14 LAB
GLUCOSE BLDC GLUCOMTR-MCNC: 194 MG/DL (ref 70–130)
GLUCOSE BLDC GLUCOMTR-MCNC: 210 MG/DL (ref 70–130)

## 2018-09-14 PROCEDURE — 25010000002 NEOSTIGMINE 10 MG/10ML SOLUTION: Performed by: NURSE ANESTHETIST, CERTIFIED REGISTERED

## 2018-09-14 PROCEDURE — 25010000002 PROPOFOL 10 MG/ML EMULSION: Performed by: NURSE ANESTHETIST, CERTIFIED REGISTERED

## 2018-09-14 PROCEDURE — 47562 LAPAROSCOPIC CHOLECYSTECTOMY: CPT | Performed by: SURGERY

## 2018-09-14 PROCEDURE — 25010000002 FENTANYL CITRATE (PF) 100 MCG/2ML SOLUTION: Performed by: NURSE ANESTHETIST, CERTIFIED REGISTERED

## 2018-09-14 PROCEDURE — 94799 UNLISTED PULMONARY SVC/PX: CPT

## 2018-09-14 PROCEDURE — 25010000002 MIDAZOLAM PER 1 MG: Performed by: NURSE ANESTHETIST, CERTIFIED REGISTERED

## 2018-09-14 PROCEDURE — 82962 GLUCOSE BLOOD TEST: CPT

## 2018-09-14 PROCEDURE — 47001 NDL BIOPSY LVR TM OTH MAJ PX: CPT | Performed by: SURGERY

## 2018-09-14 PROCEDURE — 25010000002 ONDANSETRON PER 1 MG: Performed by: NURSE ANESTHETIST, CERTIFIED REGISTERED

## 2018-09-14 RX ORDER — ROCURONIUM BROMIDE 10 MG/ML
INJECTION, SOLUTION INTRAVENOUS AS NEEDED
Status: DISCONTINUED | OUTPATIENT
Start: 2018-09-14 | End: 2018-09-14 | Stop reason: SURG

## 2018-09-14 RX ORDER — SODIUM CHLORIDE 0.9 % (FLUSH) 0.9 %
1-10 SYRINGE (ML) INJECTION AS NEEDED
Status: DISCONTINUED | OUTPATIENT
Start: 2018-09-14 | End: 2018-09-14 | Stop reason: HOSPADM

## 2018-09-14 RX ORDER — ONDANSETRON 2 MG/ML
INJECTION INTRAMUSCULAR; INTRAVENOUS AS NEEDED
Status: DISCONTINUED | OUTPATIENT
Start: 2018-09-14 | End: 2018-09-14 | Stop reason: SURG

## 2018-09-14 RX ORDER — FENTANYL CITRATE 50 UG/ML
50 INJECTION, SOLUTION INTRAMUSCULAR; INTRAVENOUS
Status: DISCONTINUED | OUTPATIENT
Start: 2018-09-14 | End: 2018-09-14 | Stop reason: HOSPADM

## 2018-09-14 RX ORDER — BUPIVACAINE HYDROCHLORIDE AND EPINEPHRINE 5; 5 MG/ML; UG/ML
INJECTION, SOLUTION PERINEURAL AS NEEDED
Status: DISCONTINUED | OUTPATIENT
Start: 2018-09-14 | End: 2018-09-14 | Stop reason: HOSPADM

## 2018-09-14 RX ORDER — OXYCODONE HYDROCHLORIDE AND ACETAMINOPHEN 5; 325 MG/1; MG/1
1 TABLET ORAL ONCE AS NEEDED
Status: DISCONTINUED | OUTPATIENT
Start: 2018-09-14 | End: 2018-09-14 | Stop reason: HOSPADM

## 2018-09-14 RX ORDER — PROPOFOL 10 MG/ML
VIAL (ML) INTRAVENOUS AS NEEDED
Status: DISCONTINUED | OUTPATIENT
Start: 2018-09-14 | End: 2018-09-14 | Stop reason: SURG

## 2018-09-14 RX ORDER — LIDOCAINE HYDROCHLORIDE 20 MG/ML
INJECTION, SOLUTION INFILTRATION; PERINEURAL AS NEEDED
Status: DISCONTINUED | OUTPATIENT
Start: 2018-09-14 | End: 2018-09-14 | Stop reason: SURG

## 2018-09-14 RX ORDER — GLYCOPYRROLATE 0.2 MG/ML
INJECTION INTRAMUSCULAR; INTRAVENOUS AS NEEDED
Status: DISCONTINUED | OUTPATIENT
Start: 2018-09-14 | End: 2018-09-14 | Stop reason: SURG

## 2018-09-14 RX ORDER — SODIUM CHLORIDE, SODIUM LACTATE, POTASSIUM CHLORIDE, CALCIUM CHLORIDE 600; 310; 30; 20 MG/100ML; MG/100ML; MG/100ML; MG/100ML
125 INJECTION, SOLUTION INTRAVENOUS CONTINUOUS
Status: DISCONTINUED | OUTPATIENT
Start: 2018-09-14 | End: 2018-09-14 | Stop reason: HOSPADM

## 2018-09-14 RX ORDER — FAMOTIDINE 10 MG/ML
INJECTION, SOLUTION INTRAVENOUS AS NEEDED
Status: DISCONTINUED | OUTPATIENT
Start: 2018-09-14 | End: 2018-09-14 | Stop reason: SURG

## 2018-09-14 RX ORDER — CETIRIZINE HYDROCHLORIDE 10 MG/1
10 TABLET ORAL DAILY
COMMUNITY

## 2018-09-14 RX ORDER — METOPROLOL TARTRATE 5 MG/5ML
INJECTION INTRAVENOUS AS NEEDED
Status: DISCONTINUED | OUTPATIENT
Start: 2018-09-14 | End: 2018-09-14 | Stop reason: SURG

## 2018-09-14 RX ORDER — NEOSTIGMINE METHYLSULFATE 1 MG/ML
INJECTION, SOLUTION INTRAVENOUS AS NEEDED
Status: DISCONTINUED | OUTPATIENT
Start: 2018-09-14 | End: 2018-09-14 | Stop reason: SURG

## 2018-09-14 RX ORDER — MIDAZOLAM HYDROCHLORIDE 1 MG/ML
INJECTION INTRAMUSCULAR; INTRAVENOUS AS NEEDED
Status: DISCONTINUED | OUTPATIENT
Start: 2018-09-14 | End: 2018-09-14 | Stop reason: SURG

## 2018-09-14 RX ORDER — ONDANSETRON 2 MG/ML
4 INJECTION INTRAMUSCULAR; INTRAVENOUS ONCE AS NEEDED
Status: DISCONTINUED | OUTPATIENT
Start: 2018-09-14 | End: 2018-09-14 | Stop reason: HOSPADM

## 2018-09-14 RX ORDER — MAGNESIUM HYDROXIDE 1200 MG/15ML
LIQUID ORAL AS NEEDED
Status: DISCONTINUED | OUTPATIENT
Start: 2018-09-14 | End: 2018-09-14 | Stop reason: HOSPADM

## 2018-09-14 RX ORDER — IPRATROPIUM BROMIDE AND ALBUTEROL SULFATE 2.5; .5 MG/3ML; MG/3ML
3 SOLUTION RESPIRATORY (INHALATION) ONCE AS NEEDED
Status: DISCONTINUED | OUTPATIENT
Start: 2018-09-14 | End: 2018-09-14 | Stop reason: HOSPADM

## 2018-09-14 RX ORDER — MEPERIDINE HYDROCHLORIDE 50 MG/ML
12.5 INJECTION INTRAMUSCULAR; INTRAVENOUS; SUBCUTANEOUS
Status: COMPLETED | OUTPATIENT
Start: 2018-09-14 | End: 2018-09-14

## 2018-09-14 RX ORDER — FENTANYL CITRATE 50 UG/ML
INJECTION, SOLUTION INTRAMUSCULAR; INTRAVENOUS AS NEEDED
Status: DISCONTINUED | OUTPATIENT
Start: 2018-09-14 | End: 2018-09-14 | Stop reason: SURG

## 2018-09-14 RX ORDER — HYDROCODONE BITARTRATE AND ACETAMINOPHEN 5; 325 MG/1; MG/1
1 TABLET ORAL EVERY 6 HOURS PRN
Qty: 12 TABLET | Refills: 0 | Status: SHIPPED | OUTPATIENT
Start: 2018-09-14 | End: 2019-01-30 | Stop reason: SDUPTHER

## 2018-09-14 RX ADMIN — NEOSTIGMINE METHYLSULFATE 3 MG: 1 INJECTION, SOLUTION INTRAVENOUS at 09:12

## 2018-09-14 RX ADMIN — FAMOTIDINE 20 MG: 10 INJECTION, SOLUTION INTRAVENOUS at 08:38

## 2018-09-14 RX ADMIN — ONDANSETRON 4 MG: 2 INJECTION, SOLUTION INTRAMUSCULAR; INTRAVENOUS at 08:42

## 2018-09-14 RX ADMIN — PROPOFOL 150 MG: 10 INJECTION, EMULSION INTRAVENOUS at 08:42

## 2018-09-14 RX ADMIN — FENTANYL CITRATE 50 MCG: 50 INJECTION INTRAMUSCULAR; INTRAVENOUS at 09:17

## 2018-09-14 RX ADMIN — MEPERIDINE HYDROCHLORIDE 12.5 MG: 50 INJECTION INTRAMUSCULAR; INTRAVENOUS; SUBCUTANEOUS at 09:42

## 2018-09-14 RX ADMIN — MEPERIDINE HYDROCHLORIDE 12.5 MG: 50 INJECTION INTRAMUSCULAR; INTRAVENOUS; SUBCUTANEOUS at 09:48

## 2018-09-14 RX ADMIN — METOPROLOL TARTRATE 3 MG: 1 INJECTION, SOLUTION INTRAVENOUS at 09:02

## 2018-09-14 RX ADMIN — METRONIDAZOLE 500 MG: 500 INJECTION, SOLUTION INTRAVENOUS at 08:38

## 2018-09-14 RX ADMIN — SODIUM CHLORIDE, POTASSIUM CHLORIDE, SODIUM LACTATE AND CALCIUM CHLORIDE 125 ML/HR: 600; 310; 30; 20 INJECTION, SOLUTION INTRAVENOUS at 07:26

## 2018-09-14 RX ADMIN — MIDAZOLAM HYDROCHLORIDE 2 MG: 1 INJECTION, SOLUTION INTRAMUSCULAR; INTRAVENOUS at 08:38

## 2018-09-14 RX ADMIN — FENTANYL CITRATE 100 MCG: 50 INJECTION INTRAMUSCULAR; INTRAVENOUS at 08:38

## 2018-09-14 RX ADMIN — LIDOCAINE HYDROCHLORIDE 40 MG: 20 INJECTION, SOLUTION INFILTRATION; PERINEURAL at 08:38

## 2018-09-14 RX ADMIN — FENTANYL CITRATE 50 MCG: 50 INJECTION INTRAMUSCULAR; INTRAVENOUS at 09:33

## 2018-09-14 RX ADMIN — ROCURONIUM BROMIDE 30 MG: 10 INJECTION INTRAVENOUS at 08:42

## 2018-09-14 RX ADMIN — FENTANYL CITRATE 50 MCG: 50 INJECTION INTRAMUSCULAR; INTRAVENOUS at 09:52

## 2018-09-14 RX ADMIN — METOPROLOL TARTRATE 2 MG: 1 INJECTION, SOLUTION INTRAVENOUS at 09:33

## 2018-09-14 RX ADMIN — FENTANYL CITRATE 100 MCG: 50 INJECTION INTRAMUSCULAR; INTRAVENOUS at 09:00

## 2018-09-14 RX ADMIN — FENTANYL CITRATE 50 MCG: 50 INJECTION INTRAMUSCULAR; INTRAVENOUS at 09:57

## 2018-09-14 RX ADMIN — OXYCODONE HYDROCHLORIDE AND ACETAMINOPHEN 1 TABLET: 5; 325 TABLET ORAL at 10:22

## 2018-09-14 RX ADMIN — GLYCOPYRROLATE 0.6 MG: 0.2 INJECTION, SOLUTION INTRAMUSCULAR; INTRAVENOUS at 09:12

## 2018-09-14 NOTE — ANESTHESIA PROCEDURE NOTES
Airway  Urgency: elective    Date/Time: 9/14/2018 8:42 AM  Airway not difficult    General Information and Staff    Patient location during procedure: OR  Anesthesiologist: DONTE MARTIN  CRNA: GIORGI MEYER    Indications and Patient Condition  Indications for airway management: airway protection    Preoxygenated: yes  MILS maintained throughout  Mask difficulty assessment: 2 - vent by mask + OA or adjuvant +/- NMBA    Final Airway Details  Final airway type: endotracheal airway      Successful airway: ETT  Cuffed: yes   Successful intubation technique: direct laryngoscopy  Facilitating devices/methods: intubating stylet  Endotracheal tube insertion site: oral  Blade: Andreea  Blade size: 3  ETT size: 7.5 mm  Cormack-Lehane Classification: grade IIa - partial view of glottis  Placement verified by: chest auscultation, capnometry and palpation of cuff   Cuff volume (mL): 6  Measured from: lips  ETT to lips (cm): 22  Number of attempts at approach: 1    Additional Comments  Dentition as preop. No complications noted. Patient tolerated well.  ETT secured.

## 2018-09-14 NOTE — ANESTHESIA PREPROCEDURE EVALUATION
Anesthesia Evaluation     NPO Solid Status: > 8 hours  NPO Liquid Status: > 8 hours           Airway   Mallampati: II  TM distance: >3 FB  Neck ROM: full  No difficulty expected  Dental - normal exam     Pulmonary - negative pulmonary ROS and normal exam   Cardiovascular - negative cardio ROS and normal exam  Exercise tolerance: excellent (>7 METS)    NYHA Classification: I        Neuro/Psych- negative ROS  GI/Hepatic/Renal/Endo      Musculoskeletal (-) negative ROS    Abdominal  - normal exam   Substance History - negative use     OB/GYN negative ob/gyn ROS         Other - negative ROS                       Anesthesia Plan    ASA 3     general     intravenous and inhalational induction   Anesthetic plan, all risks, benefits, and alternatives have been provided, discussed and informed consent has been obtained with: patient.  Use of blood products discussed with patient  Consented to blood products.   Plan discussed with CRNA.

## 2018-09-14 NOTE — OP NOTE
CHOLECYSTECTOMY LAPAROSCOPIC  Procedure Note    Perfecto Dozier  9/14/2018    Pre-op Diagnosis:   Fatty liver [K76.0]  Biliary dyskinesia [K82.8]    Post-op Diagnosis:     Post-Op Diagnosis Codes:     * Fatty liver [K76.0]     * Biliary dyskinesia [K82.8]    Procedure(s):  CHOLECYSTECTOMY LAPAROSCOPIC , LIVER BIOPSY.    Surgeon(s):  Dawson Weinstein MD    Anesthesia: General    Staff:   Circulator: Bel Carmona RN  Scrub Person: Divya Jc LPN  Assistant: Alberto Blackwood    Findings: distended gallbladder, critical view of safety obtained, right liver lobe biopsy    Operative Procedure:  The patient was taken operating suite and placed supine on operating table.  Bilateral sequential compression devices were applied and general endotracheal anesthesia administered.  The abdomen was prepped and draped in usual sterile fashion.  Preoperative antibiotics were confirmed.  Timeout procedure was performed.   A Veress needle was inserted palmers point the left upper quadrant and saline drop test confirmed entry into the peritoneal space.  Pneumoperitoneum was established.  An 11mm Optiview trocar was inserted through an infraumbilical incision.  Veress needle site was without injury.  Three 5 mm working ports were placed along the right costal margin in the standard fashion.  The fundus of the gallbladder was grasped and retracted anteriorly and superiorly.  The infundibulum was retracted laterally inferiorly.  The peritoneum on the anterior and posterior surface of the gallbladder was opened bluntly.  The cystic duct and artery were dissected free circumferentially.  The gallbladder was elevated from the gallbladder fossa for portion.  The cystic plate was then visible from the anterior posterior views with only the cystic duct and artery entering the gallbladder.  With the critical view safety obtained 5 mm hemoclips were placed with 2 proximal and one distal on each structure.  The cystic duct  and artery were transected between clips with laparoscopic scissors.  The gallbladder was then removed from the gallbladder fossa intact.  The gallbladder was placed in an Endo Catch bag was removed through the infraumbilical fascial defect.  At this time using an 18-gauge Monoptic core biopsy device to core biopsies of the right lobe of the liver were obtained.  The biopsy sites were cauterized and made hemostatic.  At this time a piece of Surgicel was placed in the gallbladder fossa.  The gobbler fossa was hemostatic and all trochars removed under direct visualization.  Pneumoperitoneum was evacuated and all skin incisions were closed with Monocryl subcuticular suture after closure of the infra umbilical fascial defect with Vicryl suture.  Counts were correct.    Estimated Blood Loss: 30 mL    Specimens:   Gallbladder, liver biopsy specimens             Drains: None    Grafts/Implants: Surgicel gallbladder fossa    Complications: None  Dawson Weinstein MD     Date: 9/14/2018  Time: 9:20 AM

## 2018-09-14 NOTE — ANESTHESIA POSTPROCEDURE EVALUATION
Patient: Perfecto Dozier    Procedure Summary     Date:  09/14/18 Room / Location:  [facility] OR 02 /  [facility] OR    Anesthesia Start:  0838 Anesthesia Stop:  0929    Procedure:  CHOLECYSTECTOMY LAPAROSCOPIC , LIVER BIOPSY. (N/A Abdomen) Diagnosis:       Fatty liver      Biliary dyskinesia      (Fatty liver [K76.0])      (Biliary dyskinesia [K82.8])    Surgeon:  Dawson Weinstein MD Provider:  Jacoby Hudson DO    Anesthesia Type:  general ASA Status:  3          Anesthesia Type: general  Last vitals  BP   175/98 (09/14/18 0935)   Temp   97.7 °F (36.5 °C) (09/14/18 0930)   Pulse   69 (09/14/18 0935)   Resp   16 (09/14/18 0935)     SpO2   91 % (09/14/18 0935)     Post Anesthesia Care and Evaluation    Patient location during evaluation: PHASE II  Patient participation: complete - patient participated  Level of consciousness: awake and alert  Pain score: 1  Pain management: adequate  Airway patency: patent  Anesthetic complications: No anesthetic complications  PONV Status: controlled  Cardiovascular status: acceptable  Respiratory status: acceptable  Hydration status: acceptable

## 2018-09-18 LAB
LAB AP CASE REPORT: NORMAL
PATH REPORT.FINAL DX SPEC: NORMAL

## 2018-09-26 ENCOUNTER — OFFICE VISIT (OUTPATIENT)
Dept: SURGERY | Facility: CLINIC | Age: 58
End: 2018-09-26

## 2018-09-26 VITALS
HEIGHT: 63 IN | BODY MASS INDEX: 28.88 KG/M2 | DIASTOLIC BLOOD PRESSURE: 71 MMHG | HEART RATE: 59 BPM | SYSTOLIC BLOOD PRESSURE: 135 MMHG | WEIGHT: 163 LBS

## 2018-09-26 DIAGNOSIS — K76.0 FATTY LIVER: ICD-10-CM

## 2018-09-26 DIAGNOSIS — K82.8 BILIARY DYSKINESIA: Primary | ICD-10-CM

## 2018-09-26 PROCEDURE — 99024 POSTOP FOLLOW-UP VISIT: CPT | Performed by: SURGERY

## 2018-09-26 NOTE — PROGRESS NOTES
Subjective   Perfecto Dozier is a 58 y.o. female  is here today for follow-up.         Perfecto Dozier is a 58 y.o. female here for followup after cholecystectomy.  The patient is doing well and tolerating diet.  her incisions are healing well.  No complaints reported.        Pathology consistent with chronic cholecystitis    Physical Exam:  NAD, AOx3  RRR  CTAB  S/appropriately tender/incisions healing well          Perfecto was seen today for s/p lap michelle.    Diagnoses and all orders for this visit:    Biliary dyskinesia    Fatty liver        Assessment     58 y.o. female s/p cholecystectomy secondary to biliary dyskinesia and fatty liver.  The bulge is consistent with chronic cholecystitis and fatty liver.  The patient will follow-up as needed.

## 2018-10-06 ENCOUNTER — HOSPITAL ENCOUNTER (EMERGENCY)
Facility: HOSPITAL | Age: 58
Discharge: HOME | End: 2018-10-06
Attending: EMERGENCY MEDICINE | Admitting: EMERGENCY MEDICINE

## 2018-10-06 ENCOUNTER — APPOINTMENT (OUTPATIENT)
Dept: GENERAL RADIOLOGY | Facility: HOSPITAL | Age: 58
End: 2018-10-06

## 2018-10-06 ENCOUNTER — APPOINTMENT (OUTPATIENT)
Dept: CT IMAGING | Facility: HOSPITAL | Age: 58
End: 2018-10-06

## 2018-10-06 DIAGNOSIS — N30.00 ACUTE CYSTITIS WITHOUT HEMATURIA: Primary | ICD-10-CM

## 2018-10-06 DIAGNOSIS — S30.1XXA ABDOMINAL WALL SEROMA, INITIAL ENCOUNTER: ICD-10-CM

## 2018-10-06 LAB
ALBUMIN SERPL-MCNC: 4.8 G/DL (ref 3.4–4.8)
ALBUMIN/GLOB SERPL: 1.4 G/DL (ref 1.5–2.5)
ALP SERPL-CCNC: 129 U/L (ref 35–104)
ALT SERPL W P-5'-P-CCNC: 20 U/L (ref 10–36)
ANION GAP SERPL CALCULATED.3IONS-SCNC: 11.3 MMOL/L (ref 3.6–11.2)
AST SERPL-CCNC: 18 U/L (ref 10–30)
BACTERIA UR QL AUTO: ABNORMAL /HPF
BASOPHILS # BLD AUTO: 0.05 10*3/MM3 (ref 0–0.3)
BASOPHILS NFR BLD AUTO: 0.5 % (ref 0–2)
BILIRUB SERPL-MCNC: 0.4 MG/DL (ref 0.2–1.8)
BILIRUB UR QL STRIP: NEGATIVE
BUN BLD-MCNC: 12 MG/DL (ref 7–21)
BUN/CREAT SERPL: 14.3 (ref 7–25)
CALCIUM SPEC-SCNC: 9.6 MG/DL (ref 7.7–10)
CHLORIDE SERPL-SCNC: 107 MMOL/L (ref 99–112)
CLARITY UR: ABNORMAL
CO2 SERPL-SCNC: 20.7 MMOL/L (ref 24.3–31.9)
COLOR UR: YELLOW
CREAT BLD-MCNC: 0.84 MG/DL (ref 0.43–1.29)
CRP SERPL-MCNC: 0.72 MG/DL (ref 0–0.99)
D DIMER PPP FEU-MCNC: 0.4 MCGFEU/ML (ref 0–0.5)
DEPRECATED RDW RBC AUTO: 42.5 FL (ref 37–54)
EOSINOPHIL # BLD AUTO: 0.17 10*3/MM3 (ref 0–0.7)
EOSINOPHIL NFR BLD AUTO: 1.7 % (ref 0–5)
ERYTHROCYTE [DISTWIDTH] IN BLOOD BY AUTOMATED COUNT: 14.5 % (ref 11.5–14.5)
GFR SERPL CREATININE-BSD FRML MDRD: 69 ML/MIN/1.73
GLOBULIN UR ELPH-MCNC: 3.4 GM/DL
GLUCOSE BLD-MCNC: 155 MG/DL (ref 70–110)
GLUCOSE UR STRIP-MCNC: ABNORMAL MG/DL
HCT VFR BLD AUTO: 46.9 % (ref 37–47)
HGB BLD-MCNC: 14.8 G/DL (ref 12–16)
HGB UR QL STRIP.AUTO: NEGATIVE
HYALINE CASTS UR QL AUTO: ABNORMAL /LPF
IMM GRANULOCYTES # BLD: 0.07 10*3/MM3 (ref 0–0.03)
IMM GRANULOCYTES NFR BLD: 0.7 % (ref 0–0.5)
KETONES UR QL STRIP: NEGATIVE
LEUKOCYTE ESTERASE UR QL STRIP.AUTO: ABNORMAL
LIPASE SERPL-CCNC: 41 U/L (ref 13–60)
LYMPHOCYTES # BLD AUTO: 2.81 10*3/MM3 (ref 1–3)
LYMPHOCYTES NFR BLD AUTO: 27.7 % (ref 21–51)
MCH RBC QN AUTO: 25.6 PG (ref 27–33)
MCHC RBC AUTO-ENTMCNC: 31.6 G/DL (ref 33–37)
MCV RBC AUTO: 81.1 FL (ref 80–94)
MONOCYTES # BLD AUTO: 0.98 10*3/MM3 (ref 0.1–0.9)
MONOCYTES NFR BLD AUTO: 9.7 % (ref 0–10)
NEUTROPHILS # BLD AUTO: 6.05 10*3/MM3 (ref 1.4–6.5)
NEUTROPHILS NFR BLD AUTO: 59.7 % (ref 30–70)
NITRITE UR QL STRIP: POSITIVE
OSMOLALITY SERPL CALC.SUM OF ELEC: 280.4 MOSM/KG (ref 273–305)
PH UR STRIP.AUTO: 7 [PH] (ref 5–8)
PLATELET # BLD AUTO: 337 10*3/MM3 (ref 130–400)
PMV BLD AUTO: 10.9 FL (ref 6–10)
POTASSIUM BLD-SCNC: 3.8 MMOL/L (ref 3.5–5.3)
PROT SERPL-MCNC: 8.2 G/DL (ref 6–8)
PROT UR QL STRIP: NEGATIVE
RBC # BLD AUTO: 5.78 10*6/MM3 (ref 4.2–5.4)
RBC # UR: ABNORMAL /HPF
REF LAB TEST METHOD: ABNORMAL
SODIUM BLD-SCNC: 139 MMOL/L (ref 135–153)
SP GR UR STRIP: >1.03 (ref 1–1.03)
SQUAMOUS #/AREA URNS HPF: ABNORMAL /HPF
TROPONIN I SERPL-MCNC: <0.006 NG/ML
UROBILINOGEN UR QL STRIP: ABNORMAL
WBC NRBC COR # BLD: 10.13 10*3/MM3 (ref 4.5–12.5)
WBC UR QL AUTO: ABNORMAL /HPF

## 2018-10-06 PROCEDURE — 87040 BLOOD CULTURE FOR BACTERIA: CPT | Performed by: PHYSICIAN ASSISTANT

## 2018-10-06 PROCEDURE — 83690 ASSAY OF LIPASE: CPT | Performed by: EMERGENCY MEDICINE

## 2018-10-06 PROCEDURE — 25010000002 MORPHINE SULFATE (PF) 2 MG/ML SOLUTION: Performed by: FAMILY MEDICINE

## 2018-10-06 PROCEDURE — 71045 X-RAY EXAM CHEST 1 VIEW: CPT | Performed by: RADIOLOGY

## 2018-10-06 PROCEDURE — 93005 ELECTROCARDIOGRAM TRACING: CPT | Performed by: EMERGENCY MEDICINE

## 2018-10-06 PROCEDURE — 74177 CT ABD & PELVIS W/CONTRAST: CPT | Performed by: RADIOLOGY

## 2018-10-06 PROCEDURE — 93010 ELECTROCARDIOGRAM REPORT: CPT | Performed by: INTERNAL MEDICINE

## 2018-10-06 PROCEDURE — 85025 COMPLETE CBC W/AUTO DIFF WBC: CPT | Performed by: EMERGENCY MEDICINE

## 2018-10-06 PROCEDURE — 25010000002 HYDROMORPHONE 1 MG/ML SOLUTION: Performed by: FAMILY MEDICINE

## 2018-10-06 PROCEDURE — 25010000002 IOPAMIDOL 61 % SOLUTION: Performed by: EMERGENCY MEDICINE

## 2018-10-06 PROCEDURE — 96365 THER/PROPH/DIAG IV INF INIT: CPT

## 2018-10-06 PROCEDURE — 25010000002 CEFTRIAXONE: Performed by: PHYSICIAN ASSISTANT

## 2018-10-06 PROCEDURE — 99284 EMERGENCY DEPT VISIT MOD MDM: CPT

## 2018-10-06 PROCEDURE — 96361 HYDRATE IV INFUSION ADD-ON: CPT

## 2018-10-06 PROCEDURE — 25010000002 ONDANSETRON PER 1 MG: Performed by: EMERGENCY MEDICINE

## 2018-10-06 PROCEDURE — 81001 URINALYSIS AUTO W/SCOPE: CPT | Performed by: EMERGENCY MEDICINE

## 2018-10-06 PROCEDURE — 71045 X-RAY EXAM CHEST 1 VIEW: CPT

## 2018-10-06 PROCEDURE — 96375 TX/PRO/DX INJ NEW DRUG ADDON: CPT

## 2018-10-06 PROCEDURE — 85379 FIBRIN DEGRADATION QUANT: CPT | Performed by: EMERGENCY MEDICINE

## 2018-10-06 PROCEDURE — 25010000002 HYDROMORPHONE 1 MG/ML SOLUTION: Performed by: EMERGENCY MEDICINE

## 2018-10-06 PROCEDURE — 80053 COMPREHEN METABOLIC PANEL: CPT | Performed by: EMERGENCY MEDICINE

## 2018-10-06 PROCEDURE — 86140 C-REACTIVE PROTEIN: CPT | Performed by: EMERGENCY MEDICINE

## 2018-10-06 PROCEDURE — 84484 ASSAY OF TROPONIN QUANT: CPT | Performed by: EMERGENCY MEDICINE

## 2018-10-06 PROCEDURE — 96376 TX/PRO/DX INJ SAME DRUG ADON: CPT

## 2018-10-06 PROCEDURE — 74177 CT ABD & PELVIS W/CONTRAST: CPT

## 2018-10-06 RX ORDER — ONDANSETRON 2 MG/ML
4 INJECTION INTRAMUSCULAR; INTRAVENOUS ONCE
Status: COMPLETED | OUTPATIENT
Start: 2018-10-06 | End: 2018-10-06

## 2018-10-06 RX ORDER — MORPHINE SULFATE 2 MG/ML
4 INJECTION, SOLUTION INTRAMUSCULAR; INTRAVENOUS ONCE
Status: COMPLETED | OUTPATIENT
Start: 2018-10-06 | End: 2018-10-06

## 2018-10-06 RX ORDER — SODIUM CHLORIDE 0.9 % (FLUSH) 0.9 %
10 SYRINGE (ML) INJECTION AS NEEDED
Status: DISCONTINUED | OUTPATIENT
Start: 2018-10-06 | End: 2018-10-07 | Stop reason: HOSPADM

## 2018-10-06 RX ORDER — SULFAMETHOXAZOLE AND TRIMETHOPRIM 800; 160 MG/1; MG/1
1 TABLET ORAL 2 TIMES DAILY
Qty: 10 TABLET | Refills: 0 | Status: SHIPPED | OUTPATIENT
Start: 2018-10-06 | End: 2018-10-11

## 2018-10-06 RX ADMIN — HYDROMORPHONE HYDROCHLORIDE 1 MG: 1 INJECTION, SOLUTION INTRAMUSCULAR; INTRAVENOUS; SUBCUTANEOUS at 19:34

## 2018-10-06 RX ADMIN — ONDANSETRON 4 MG: 2 INJECTION, SOLUTION INTRAMUSCULAR; INTRAVENOUS at 19:37

## 2018-10-06 RX ADMIN — CEFTRIAXONE 1 G: 1 INJECTION, POWDER, FOR SOLUTION INTRAMUSCULAR; INTRAVENOUS at 21:47

## 2018-10-06 RX ADMIN — HYDROMORPHONE HYDROCHLORIDE 1 MG: 1 INJECTION, SOLUTION INTRAMUSCULAR; INTRAVENOUS; SUBCUTANEOUS at 21:11

## 2018-10-06 RX ADMIN — SODIUM CHLORIDE 1000 ML: 9 INJECTION, SOLUTION INTRAVENOUS at 21:06

## 2018-10-06 RX ADMIN — MORPHINE SULFATE 4 MG: 2 INJECTION, SOLUTION INTRAMUSCULAR; INTRAVENOUS at 23:47

## 2018-10-06 RX ADMIN — IOPAMIDOL 90 ML: 612 INJECTION, SOLUTION INTRAVENOUS at 20:57

## 2018-10-06 NOTE — ED NOTES
Patient brought to room 117 in ED via wheelchair at this time. Patient independently ambulated to ED stretcher. Patient reports three weeks ago she had a cholecystectomy and a liver biopsy performed by Dr Weinstein. States she began hurting in her RUQ last Sunday and describes the pain as intermittent in nature. Patient reports the pain began today around noon and has continued to worsen. Patient describes the pain as a dull, pulsating pain that radiates into the right side of her back. Patient is alert and oriented x4, denies any known fever. Patient reports nausea however denies any vomiting or diarrhea. Patient denies any shortness of breath or chest pain.      Rani Ball, CESAR  10/06/18 1804

## 2018-10-06 NOTE — ED PROVIDER NOTES
Subjective     Abdominal Pain   Pain location:  RUQ  Pain quality: sharp    Pain radiates to:  Does not radiate  Pain severity:  Moderate  Onset quality:  Sudden  Duration:  6 days  Timing:  Intermittent  Progression:  Worsening  Chronicity:  New  Context: eating    Relieved by:  Nothing  Worsened by:  Nothing  Associated symptoms: no chest pain, no cough, no dysuria, no fever, no shortness of breath and no vomiting        Review of Systems   Constitutional: Negative.  Negative for fever.   HENT: Negative.    Respiratory: Negative.  Negative for cough and shortness of breath.    Cardiovascular: Negative.  Negative for chest pain.   Gastrointestinal: Positive for abdominal pain. Negative for vomiting.   Endocrine: Negative.    Genitourinary: Negative.  Negative for dysuria.   Skin: Negative.    Neurological: Negative.    Psychiatric/Behavioral: Negative.    All other systems reviewed and are negative.      Past Medical History:   Diagnosis Date   • Abdominal pain    • Diabetes mellitus (CMS/HCC)    • Elevated cholesterol    • GERD (gastroesophageal reflux disease)    • Heartburn    • Hypertension        Allergies   Allergen Reactions   • Penicillins Hives and Swelling       Past Surgical History:   Procedure Laterality Date   • ABDOMINAL SURGERY     • CHOLECYSTECTOMY N/A 9/14/2018    Procedure: CHOLECYSTECTOMY LAPAROSCOPIC , LIVER BIOPSY.;  Surgeon: Dawson Weinstein MD;  Location: [facility];  Service: General   • COLONOSCOPY     • HYSTERECTOMY  01/2015    Complete   • MASS EXCISION       thyroid nodule many years ago    • OOPHORECTOMY Bilateral 01/2015    Complete   • THYROID SURGERY     • TUBAL ABDOMINAL LIGATION         Family History   Problem Relation Age of Onset   • Breast cancer Mother 58   • Breast cancer Maternal Aunt 62   • Ovarian cancer Neg Hx        Social History     Social History   • Marital status:      Social History Main Topics   • Smoking status: Never Smoker   • Smokeless tobacco:  Never Used   • Alcohol use No   • Drug use: No   • Sexual activity: Defer     Other Topics Concern   • Not on file           Objective   Physical Exam   Constitutional: She is oriented to person, place, and time. She appears well-developed and well-nourished. No distress.   HENT:   Head: Normocephalic and atraumatic.   Right Ear: External ear normal.   Left Ear: External ear normal.   Nose: Nose normal.   Eyes: Pupils are equal, round, and reactive to light. Conjunctivae and EOM are normal.   Neck: Normal range of motion. Neck supple. No JVD present. No tracheal deviation present.   Cardiovascular: Normal rate, regular rhythm and normal heart sounds.    No murmur heard.  Pulmonary/Chest: Effort normal and breath sounds normal. No respiratory distress. She has no wheezes.   Abdominal: Soft. Bowel sounds are normal. There is tenderness (RUQ).   Musculoskeletal: Normal range of motion. She exhibits no edema or deformity.   Neurological: She is alert and oriented to person, place, and time. No cranial nerve deficit.   Skin: Skin is warm and dry. No rash noted. She is not diaphoretic. No erythema. No pallor.   Psychiatric: She has a normal mood and affect. Her behavior is normal. Thought content normal.   Nursing note and vitals reviewed.      Procedures           ED Course  ED Course as of Oct 06 2317   Sat Oct 06, 2018   2003 Endorsed to Marlen Carty  [JI]   2102 Sinus tachycardia; no acute ST changes per Dr. Wilcox.  ECG 12 Lead [MM]   2204 Small fluid collection in gallbladder fossa with focus of gas; may represent seroma; less likely is an abscess or bile leak per [facility] report.  CT Abdomen Pelvis With Contrast [MM]   2220 No acute findings per Dr. Wilcox.  XR Chest 1 View [MM]   2223 Spoke with Dr. Jc about the patient and he says outpatient follow up with Dr. Weinstein will be fine.   [MM]   2303 Had an extensive discussion with the patient and her family member about her results. They were somewhat concerned  about her pain, though it is resolved now. I offered to call Dr. Jc back and ask him to admit her for observation but they declined that option. Pain is controlled at this time. She has never had chest pain or SOB during her course of stay here. She has zofran and norco at home that she was given post-op and hadn't taken. She is diagnosed with post-op seroma, UTI. Will be d/c home with rx for bactrim and f/u with Dr. Weinstein on Monday and PCP next week. Will return to ER if symptoms worsen.   [MM]      ED Course User Index  [JI] Bobby Medel PA  [MM] Marlen Carty PA                  MDM  Number of Diagnoses or Management Options  Abdominal wall seroma, initial encounter (CMS/[facility]):   Acute cystitis without hematuria:      Amount and/or Complexity of Data Reviewed  Clinical lab tests: ordered and reviewed  Tests in the radiology section of CPT®: ordered and reviewed  Decide to obtain previous medical records or to obtain history from someone other than the patient: yes  Discuss the patient with other providers: yes          Final diagnoses:   Acute cystitis without hematuria   Abdominal wall seroma, initial encounter (CMS/[facility])            Marlen Carty PA  10/06/18 3300

## 2018-10-07 VITALS
HEART RATE: 80 BPM | RESPIRATION RATE: 16 BRPM | TEMPERATURE: 97.9 F | WEIGHT: 162 LBS | BODY MASS INDEX: 29.81 KG/M2 | HEIGHT: 62 IN | SYSTOLIC BLOOD PRESSURE: 112 MMHG | OXYGEN SATURATION: 100 % | DIASTOLIC BLOOD PRESSURE: 67 MMHG

## 2018-10-07 NOTE — DISCHARGE INSTRUCTIONS
Please increase fluid intake and finish your bactrim prescription. Please take ibuprofen and your pain medication as needed for pain. Please use zofran as needed for nausea. Please see Dr. Weinstein on Monday and your PCP next week. Please return to ER if symptoms worsen.

## 2018-10-07 NOTE — ED NOTES
Patient reports her RUQ pain is better after the administration of 1mg IV dilaudid however continues to report her back pain as a 10/10     Rani Ball RN  10/06/18 2116

## 2018-10-10 DIAGNOSIS — R10.11 RUQ PAIN: Primary | ICD-10-CM

## 2018-10-11 LAB
BACTERIA SPEC AEROBE CULT: NORMAL
BACTERIA SPEC AEROBE CULT: NORMAL

## 2018-10-17 ENCOUNTER — OFFICE VISIT (OUTPATIENT)
Dept: SURGERY | Facility: CLINIC | Age: 58
End: 2018-10-17

## 2018-10-17 ENCOUNTER — HOSPITAL ENCOUNTER (OUTPATIENT)
Dept: NUCLEAR MEDICINE | Facility: HOSPITAL | Age: 58
Discharge: HOME | End: 2018-10-17
Attending: SURGERY

## 2018-10-17 VITALS — WEIGHT: 162 LBS | HEIGHT: 62 IN | BODY MASS INDEX: 29.81 KG/M2

## 2018-10-17 DIAGNOSIS — K82.8 BILIARY DYSKINESIA: Primary | ICD-10-CM

## 2018-10-17 PROCEDURE — 78226 HEPATOBILIARY SYSTEM IMAGING: CPT

## 2018-10-17 PROCEDURE — 99024 POSTOP FOLLOW-UP VISIT: CPT | Performed by: SURGERY

## 2018-10-17 PROCEDURE — A9537 TC99M MEBROFENIN: HCPCS | Performed by: SURGERY

## 2018-10-17 PROCEDURE — 78226 HEPATOBILIARY SYSTEM IMAGING: CPT | Performed by: RADIOLOGY

## 2018-10-17 PROCEDURE — 0 TECHNETIUM TC 99M MEBROFENIN KIT: Performed by: SURGERY

## 2018-10-17 RX ORDER — KIT FOR THE PREPARATION OF TECHNETIUM TC 99M MEBROFENIN 45 MG/10ML
1 INJECTION, POWDER, LYOPHILIZED, FOR SOLUTION INTRAVENOUS
Status: COMPLETED | OUTPATIENT
Start: 2018-10-17 | End: 2018-10-17

## 2018-10-17 RX ADMIN — MEBROFENIN 1 DOSE: 45 INJECTION, POWDER, LYOPHILIZED, FOR SOLUTION INTRAVENOUS at 08:55

## 2018-10-20 ENCOUNTER — APPOINTMENT (OUTPATIENT)
Dept: NUCLEAR MEDICINE | Facility: HOSPITAL | Age: 58
End: 2018-10-20
Attending: SURGERY

## 2018-10-20 NOTE — PROGRESS NOTES
Subjective   Perfecto Dozier is a 58 y.o. female  is here today for follow-up.         Perfecto Dozier is a 58 y.o. female here for followup after cholecystectomy.  The patient is doing well and tolerating diet.  her incisions are healing well.  No complaints reported.  Pain improved and HIDA negative for leak or complication.      Pathology consistent with chronic cholecystitis    Physical Exam:  NAD, AOx3  RRR  CTAB  S/appropriately tender/incisions healing well          Perfecto was seen today for hida scan follow up.    Diagnoses and all orders for this visit:    Biliary dyskinesia        Assessment     58 y.o. female s/p cholecystectomy secondary to biliary dyskinesia and fatty liver.  Patient RUQ pain has improved and she is doing well.  HIDA negative for complication.  She will follow up PRN

## 2019-01-23 ENCOUNTER — TRANSCRIBE ORDERS (OUTPATIENT)
Dept: ADMINISTRATIVE | Facility: HOSPITAL | Age: 59
End: 2019-01-23

## 2019-01-23 DIAGNOSIS — R10.9 ACUTE ABDOMINAL PAIN: Primary | ICD-10-CM

## 2019-01-25 ENCOUNTER — HOSPITAL ENCOUNTER (OUTPATIENT)
Dept: ULTRASOUND IMAGING | Facility: HOSPITAL | Age: 59
Discharge: HOME | End: 2019-01-25
Admitting: NURSE PRACTITIONER

## 2019-01-25 DIAGNOSIS — R10.9 ACUTE ABDOMINAL PAIN: ICD-10-CM

## 2019-01-25 PROCEDURE — 76700 US EXAM ABDOM COMPLETE: CPT | Performed by: RADIOLOGY

## 2019-01-25 PROCEDURE — 76700 US EXAM ABDOM COMPLETE: CPT

## 2019-01-30 ENCOUNTER — APPOINTMENT (OUTPATIENT)
Dept: CT IMAGING | Facility: HOSPITAL | Age: 59
End: 2019-01-30

## 2019-01-30 ENCOUNTER — HOSPITAL ENCOUNTER (EMERGENCY)
Facility: HOSPITAL | Age: 59
Discharge: HOME | End: 2019-01-30
Attending: EMERGENCY MEDICINE | Admitting: EMERGENCY MEDICINE

## 2019-01-30 VITALS
WEIGHT: 159 LBS | TEMPERATURE: 98 F | DIASTOLIC BLOOD PRESSURE: 104 MMHG | HEART RATE: 75 BPM | HEIGHT: 62 IN | BODY MASS INDEX: 29.26 KG/M2 | OXYGEN SATURATION: 97 % | RESPIRATION RATE: 16 BRPM | SYSTOLIC BLOOD PRESSURE: 149 MMHG

## 2019-01-30 DIAGNOSIS — R10.11 RUQ ABDOMINAL PAIN: Primary | ICD-10-CM

## 2019-01-30 DIAGNOSIS — K59.00 CONSTIPATION, UNSPECIFIED CONSTIPATION TYPE: ICD-10-CM

## 2019-01-30 LAB
ALBUMIN SERPL-MCNC: 4.7 G/DL (ref 3.4–4.8)
ALBUMIN/GLOB SERPL: 1.7 G/DL (ref 1.5–2.5)
ALP SERPL-CCNC: 111 U/L (ref 35–104)
ALT SERPL W P-5'-P-CCNC: 19 U/L (ref 10–36)
ANION GAP SERPL CALCULATED.3IONS-SCNC: 8 MMOL/L (ref 3.6–11.2)
AST SERPL-CCNC: 17 U/L (ref 10–30)
BASOPHILS # BLD AUTO: 0.05 10*3/MM3 (ref 0–0.3)
BASOPHILS NFR BLD AUTO: 0.5 % (ref 0–2)
BILIRUB SERPL-MCNC: 0.3 MG/DL (ref 0.2–1.8)
BILIRUB UR QL STRIP: NEGATIVE
BUN BLD-MCNC: 15 MG/DL (ref 7–21)
BUN/CREAT SERPL: 23.4 (ref 7–25)
CALCIUM SPEC-SCNC: 10.1 MG/DL (ref 7.7–10)
CHLORIDE SERPL-SCNC: 108 MMOL/L (ref 99–112)
CLARITY UR: CLEAR
CO2 SERPL-SCNC: 25 MMOL/L (ref 24.3–31.9)
COLOR UR: YELLOW
CREAT BLD-MCNC: 0.64 MG/DL (ref 0.43–1.29)
DEPRECATED RDW RBC AUTO: 44.6 FL (ref 37–54)
EOSINOPHIL # BLD AUTO: 0.16 10*3/MM3 (ref 0–0.7)
EOSINOPHIL NFR BLD AUTO: 1.7 % (ref 0–5)
ERYTHROCYTE [DISTWIDTH] IN BLOOD BY AUTOMATED COUNT: 14.8 % (ref 11.5–14.5)
GFR SERPL CREATININE-BSD FRML MDRD: 95 ML/MIN/1.73
GLOBULIN UR ELPH-MCNC: 2.8 GM/DL
GLUCOSE BLD-MCNC: 110 MG/DL (ref 70–110)
GLUCOSE UR STRIP-MCNC: ABNORMAL MG/DL
HCT VFR BLD AUTO: 44.9 % (ref 37–47)
HGB BLD-MCNC: 14.1 G/DL (ref 12–16)
HGB UR QL STRIP.AUTO: NEGATIVE
IMM GRANULOCYTES # BLD AUTO: 0.06 10*3/MM3 (ref 0–0.03)
IMM GRANULOCYTES NFR BLD AUTO: 0.6 % (ref 0–0.5)
KETONES UR QL STRIP: NEGATIVE
LEUKOCYTE ESTERASE UR QL STRIP.AUTO: NEGATIVE
LIPASE SERPL-CCNC: 45 U/L (ref 13–60)
LYMPHOCYTES # BLD AUTO: 2.83 10*3/MM3 (ref 1–3)
LYMPHOCYTES NFR BLD AUTO: 29.5 % (ref 21–51)
MCH RBC QN AUTO: 26 PG (ref 27–33)
MCHC RBC AUTO-ENTMCNC: 31.4 G/DL (ref 33–37)
MCV RBC AUTO: 82.7 FL (ref 80–94)
MONOCYTES # BLD AUTO: 0.68 10*3/MM3 (ref 0.1–0.9)
MONOCYTES NFR BLD AUTO: 7.1 % (ref 0–10)
NEUTROPHILS # BLD AUTO: 5.81 10*3/MM3 (ref 1.4–6.5)
NEUTROPHILS NFR BLD AUTO: 60.6 % (ref 30–70)
NITRITE UR QL STRIP: NEGATIVE
OSMOLALITY SERPL CALC.SUM OF ELEC: 282.7 MOSM/KG (ref 273–305)
PH UR STRIP.AUTO: 6.5 [PH] (ref 5–8)
PLATELET # BLD AUTO: 288 10*3/MM3 (ref 130–400)
PMV BLD AUTO: 10.9 FL (ref 6–10)
POTASSIUM BLD-SCNC: 3.9 MMOL/L (ref 3.5–5.3)
PROT SERPL-MCNC: 7.5 G/DL (ref 6–8)
PROT UR QL STRIP: NEGATIVE
RBC # BLD AUTO: 5.43 10*6/MM3 (ref 4.2–5.4)
SODIUM BLD-SCNC: 141 MMOL/L (ref 135–153)
SP GR UR STRIP: 1.02 (ref 1–1.03)
TROPONIN I SERPL-MCNC: <0.006 NG/ML
UROBILINOGEN UR QL STRIP: ABNORMAL
WBC NRBC COR # BLD: 9.59 10*3/MM3 (ref 4.5–12.5)

## 2019-01-30 PROCEDURE — 99284 EMERGENCY DEPT VISIT MOD MDM: CPT

## 2019-01-30 PROCEDURE — 96375 TX/PRO/DX INJ NEW DRUG ADDON: CPT

## 2019-01-30 PROCEDURE — 71250 CT THORAX DX C-: CPT

## 2019-01-30 PROCEDURE — 74177 CT ABD & PELVIS W/CONTRAST: CPT | Performed by: RADIOLOGY

## 2019-01-30 PROCEDURE — 93005 ELECTROCARDIOGRAM TRACING: CPT | Performed by: PHYSICIAN ASSISTANT

## 2019-01-30 PROCEDURE — 81003 URINALYSIS AUTO W/O SCOPE: CPT | Performed by: PHYSICIAN ASSISTANT

## 2019-01-30 PROCEDURE — 25010000002 PROMETHAZINE PER 50 MG: Performed by: EMERGENCY MEDICINE

## 2019-01-30 PROCEDURE — 85025 COMPLETE CBC W/AUTO DIFF WBC: CPT | Performed by: PHYSICIAN ASSISTANT

## 2019-01-30 PROCEDURE — 71250 CT THORAX DX C-: CPT | Performed by: RADIOLOGY

## 2019-01-30 PROCEDURE — 93010 ELECTROCARDIOGRAM REPORT: CPT | Performed by: INTERNAL MEDICINE

## 2019-01-30 PROCEDURE — 84484 ASSAY OF TROPONIN QUANT: CPT | Performed by: PHYSICIAN ASSISTANT

## 2019-01-30 PROCEDURE — 25010000002 KETOROLAC TROMETHAMINE PER 15 MG: Performed by: PHYSICIAN ASSISTANT

## 2019-01-30 PROCEDURE — 96374 THER/PROPH/DIAG INJ IV PUSH: CPT

## 2019-01-30 PROCEDURE — 80053 COMPREHEN METABOLIC PANEL: CPT | Performed by: PHYSICIAN ASSISTANT

## 2019-01-30 PROCEDURE — 96376 TX/PRO/DX INJ SAME DRUG ADON: CPT

## 2019-01-30 PROCEDURE — 83690 ASSAY OF LIPASE: CPT | Performed by: PHYSICIAN ASSISTANT

## 2019-01-30 PROCEDURE — 74177 CT ABD & PELVIS W/CONTRAST: CPT

## 2019-01-30 PROCEDURE — 25010000002 HYDROMORPHONE 1 MG/ML SOLUTION: Performed by: EMERGENCY MEDICINE

## 2019-01-30 PROCEDURE — 25010000002 METHYLPREDNISOLONE PER 125 MG: Performed by: PHYSICIAN ASSISTANT

## 2019-01-30 PROCEDURE — 25010000002 IOPAMIDOL 61 % SOLUTION: Performed by: EMERGENCY MEDICINE

## 2019-01-30 RX ORDER — ONDANSETRON 4 MG/1
4 TABLET, ORALLY DISINTEGRATING ORAL EVERY 6 HOURS PRN
Qty: 20 TABLET | Refills: 0 | Status: SHIPPED | OUTPATIENT
Start: 2019-01-30 | End: 2019-03-04

## 2019-01-30 RX ORDER — SODIUM CHLORIDE 0.9 % (FLUSH) 0.9 %
10 SYRINGE (ML) INJECTION AS NEEDED
Status: DISCONTINUED | OUTPATIENT
Start: 2019-01-30 | End: 2019-01-30 | Stop reason: HOSPADM

## 2019-01-30 RX ORDER — PROMETHAZINE HYDROCHLORIDE 25 MG/ML
12.5 INJECTION, SOLUTION INTRAMUSCULAR; INTRAVENOUS ONCE
Status: COMPLETED | OUTPATIENT
Start: 2019-01-30 | End: 2019-01-30

## 2019-01-30 RX ORDER — KETOROLAC TROMETHAMINE 30 MG/ML
15 INJECTION, SOLUTION INTRAMUSCULAR; INTRAVENOUS ONCE
Status: COMPLETED | OUTPATIENT
Start: 2019-01-30 | End: 2019-01-30

## 2019-01-30 RX ORDER — HYDROCODONE BITARTRATE AND ACETAMINOPHEN 5; 325 MG/1; MG/1
1 TABLET ORAL EVERY 6 HOURS PRN
Qty: 12 TABLET | Refills: 0 | Status: SHIPPED | OUTPATIENT
Start: 2019-01-30 | End: 2019-03-04

## 2019-01-30 RX ORDER — METHYLPREDNISOLONE SODIUM SUCCINATE 125 MG/2ML
125 INJECTION, POWDER, LYOPHILIZED, FOR SOLUTION INTRAMUSCULAR; INTRAVENOUS ONCE
Status: COMPLETED | OUTPATIENT
Start: 2019-01-30 | End: 2019-01-30

## 2019-01-30 RX ORDER — DICYCLOMINE HCL 20 MG
20 TABLET ORAL EVERY 6 HOURS
Qty: 20 TABLET | Refills: 0 | Status: SHIPPED | OUTPATIENT
Start: 2019-01-30 | End: 2019-03-04

## 2019-01-30 RX ORDER — DICYCLOMINE HYDROCHLORIDE 10 MG/1
20 CAPSULE ORAL ONCE
Status: COMPLETED | OUTPATIENT
Start: 2019-01-30 | End: 2019-01-30

## 2019-01-30 RX ADMIN — HYDROMORPHONE HYDROCHLORIDE 1 MG: 1 INJECTION, SOLUTION INTRAMUSCULAR; INTRAVENOUS; SUBCUTANEOUS at 17:26

## 2019-01-30 RX ADMIN — HYDROMORPHONE HYDROCHLORIDE 1 MG: 1 INJECTION, SOLUTION INTRAMUSCULAR; INTRAVENOUS; SUBCUTANEOUS at 16:30

## 2019-01-30 RX ADMIN — PROMETHAZINE HYDROCHLORIDE 12.5 MG: 25 INJECTION INTRAMUSCULAR; INTRAVENOUS at 16:29

## 2019-01-30 RX ADMIN — DICYCLOMINE HYDROCHLORIDE 20 MG: 10 CAPSULE ORAL at 17:26

## 2019-01-30 RX ADMIN — KETOROLAC TROMETHAMINE 15 MG: 30 INJECTION, SOLUTION INTRAMUSCULAR; INTRAVENOUS at 19:59

## 2019-01-30 RX ADMIN — METHYLPREDNISOLONE SODIUM SUCCINATE 125 MG: 125 INJECTION, POWDER, FOR SOLUTION INTRAMUSCULAR; INTRAVENOUS at 19:59

## 2019-01-30 RX ADMIN — SODIUM CHLORIDE 1000 ML: 9 INJECTION, SOLUTION INTRAVENOUS at 16:24

## 2019-01-30 RX ADMIN — IOPAMIDOL 100 ML: 612 INJECTION, SOLUTION INTRAVENOUS at 18:32

## 2019-02-04 ENCOUNTER — APPOINTMENT (OUTPATIENT)
Dept: ULTRASOUND IMAGING | Facility: HOSPITAL | Age: 59
End: 2019-02-04

## 2019-02-11 ENCOUNTER — OFFICE VISIT (OUTPATIENT)
Dept: SURGERY | Facility: CLINIC | Age: 59
End: 2019-02-11

## 2019-02-11 VITALS — HEIGHT: 62 IN | BODY MASS INDEX: 29.63 KG/M2 | WEIGHT: 161 LBS

## 2019-02-11 DIAGNOSIS — K82.8 BILIARY DYSKINESIA: ICD-10-CM

## 2019-02-11 DIAGNOSIS — R10.10 PAIN OF UPPER ABDOMEN: Primary | ICD-10-CM

## 2019-02-11 PROCEDURE — 99213 OFFICE O/P EST LOW 20 MIN: CPT | Performed by: SURGERY

## 2019-02-11 RX ORDER — AMOXICILLIN 250 MG
2 CAPSULE ORAL DAILY PRN
Qty: 30 TABLET | Refills: 1 | Status: SHIPPED | OUTPATIENT
Start: 2019-02-11 | End: 2019-03-04

## 2019-02-12 NOTE — PROGRESS NOTES
Subjective   Perfecto Dozier is a 58 y.o. female  is here today for follow-up.         Perfecto Dozier is a 58 y.o. female here for follow up for epigastric and right-sided abdominal pain.  She underwent cholecystectomy for biliary dyskinesia.  Patient is doing well with this regard with no abnormality on imaging or labs.  The patient does report significant constipation and currently is on no bowel regimen though she does take fiber pills daily.  No blood per rectum but the pain is described as constant and crampy in nature.  On examination she is nontender.            Assessment     Perfecto was seen today for follow-up.    Diagnoses and all orders for this visit:    Pain of upper abdomen    Biliary dyskinesia    Other orders  -     senna-docusate (PERICOLACE) 8.6-50 MG per tablet; Take 2 tablets by mouth Daily As Needed for Constipation.      Perfecto Dozier is a 58 y.o. female with abdominal pain likely secondary to constipation.  She will be initiated on a bowel regimen and follow-up in 2 weeks.

## 2019-02-27 ENCOUNTER — OFFICE VISIT (OUTPATIENT)
Dept: SURGERY | Facility: CLINIC | Age: 59
End: 2019-02-27

## 2019-02-27 VITALS — HEIGHT: 62 IN | WEIGHT: 161 LBS | BODY MASS INDEX: 29.63 KG/M2

## 2019-02-27 DIAGNOSIS — R10.10 PAIN OF UPPER ABDOMEN: Primary | ICD-10-CM

## 2019-02-27 PROCEDURE — 99213 OFFICE O/P EST LOW 20 MIN: CPT | Performed by: SURGERY

## 2019-03-01 NOTE — H&P (VIEW-ONLY)
Subjective   Perfecto Dozier is a 58 y.o. female is being seen for follow-up    Perfecto Dozier is a 58 y.o. female With constipation and abdominal pain.  Her last colonoscopy was greater than 10 years ago.  The patient's pain has somewhat improved with PPI therapy.  She has undergone cholecystectomy with no evidence of complication postoperatively.  No blood per rectum.  No change in bowel caliber though her constipation is new.  This is somewhat improved with stool softeners and laxatives.  No family history of colon cancer.        Past Medical History:   Diagnosis Date   • Abdominal pain    • Diabetes mellitus (CMS/HCC)    • Elevated cholesterol    • GERD (gastroesophageal reflux disease)    • Heartburn    • Hypertension        Family History   Problem Relation Age of Onset   • Breast cancer Mother 58   • Breast cancer Maternal Aunt 62   • Ovarian cancer Neg Hx        Social History     Socioeconomic History   • Marital status:      Spouse name: Not on file   • Number of children: Not on file   • Years of education: Not on file   • Highest education level: Not on file   Social Needs   • Financial resource strain: Not on file   • Food insecurity - worry: Not on file   • Food insecurity - inability: Not on file   • Transportation needs - medical: Not on file   • Transportation needs - non-medical: Not on file   Occupational History   • Not on file   Tobacco Use   • Smoking status: Never Smoker   • Smokeless tobacco: Never Used   Substance and Sexual Activity   • Alcohol use: No   • Drug use: No   • Sexual activity: Defer     Birth control/protection: Post-menopausal, Surgical   Other Topics Concern   • Not on file   Social History Narrative   • Not on file       Past Surgical History:   Procedure Laterality Date   • ABDOMINAL SURGERY     • CHOLECYSTECTOMY N/A 9/14/2018    Procedure: CHOLECYSTECTOMY LAPAROSCOPIC , LIVER BIOPSY.;  Surgeon: Dawson Weinstein MD;  Location: [facility] OR;  Service: General   •  "COLONOSCOPY     • HYSTERECTOMY  01/2015    Complete   • MASS EXCISION       thyroid nodule many years ago    • OOPHORECTOMY Bilateral 01/2015    Complete   • THYROID SURGERY     • TUBAL ABDOMINAL LIGATION         Review of Systems   Constitutional: Negative for activity change, appetite change, chills and fever.   HENT: Negative for sore throat and trouble swallowing.    Eyes: Negative for visual disturbance.   Respiratory: Negative for cough and shortness of breath.    Cardiovascular: Negative for chest pain and palpitations.   Gastrointestinal: Positive for abdominal pain and constipation. Negative for abdominal distention, blood in stool, diarrhea, nausea and vomiting.   Endocrine: Negative for cold intolerance and heat intolerance.   Genitourinary: Negative for dysuria.   Musculoskeletal: Negative for joint swelling.   Skin: Negative for color change, rash and wound.   Allergic/Immunologic: Negative for immunocompromised state.   Neurological: Negative for dizziness, seizures, weakness and headaches.   Hematological: Negative for adenopathy. Does not bruise/bleed easily.   Psychiatric/Behavioral: Negative for agitation and confusion.         Ht 157.5 cm (62\")   Wt 73 kg (161 lb)   LMP  (LMP Unknown)   BMI 29.45 kg/m²   Objective   Physical Exam   Constitutional: She is oriented to person, place, and time. She appears well-developed.   HENT:   Head: Normocephalic and atraumatic.   Mouth/Throat: Mucous membranes are normal.   Eyes: Conjunctivae are normal. Pupils are equal, round, and reactive to light.   Neck: Neck supple. No JVD present. No tracheal deviation present. No thyromegaly present.   Cardiovascular: Normal rate and regular rhythm. Exam reveals no gallop and no friction rub.   No murmur heard.  Pulmonary/Chest: Effort normal and breath sounds normal.   Abdominal: Soft. She exhibits no distension. There is no splenomegaly or hepatomegaly. There is no tenderness. No hernia.   Musculoskeletal: Normal " range of motion. She exhibits no deformity.   Neurological: She is alert and oriented to person, place, and time.   Skin: Skin is warm and dry.   Psychiatric: She has a normal mood and affect.             Assessment   Perfceto was seen today for abdominal pain.    Diagnoses and all orders for this visit:    Pain of upper abdomen  -     Case Request; Standing  -     Case Request    Other orders  -     Follow anesthesia standing orders.  -     Provide NPO Instructions to Patient; Future  -     Follow anesthesia standing orders.; Standing  -     Verify NPO Status; Standing  -     Obtain informed consent; Standing  -     SCD (sequential compression device)- to be placed on patient in Pre-op; Standing  -     Verify / Perform Chlorhexidine Skin Prep if Indicated (If Not Already Completed); Standing      Perfecto Dozier is a 58 y.o. female with abdominal pain of unknown etiology.  She does have constipation and is due for screening colonoscopy and therefore will undergo diagnostic colonoscopy.  She understands risks and benefits of the procedure.    Patient's Body mass index is 29.45 kg/m². BMI is above normal parameters. Recommendations include: educational material.

## 2019-03-07 ENCOUNTER — ANESTHESIA EVENT (OUTPATIENT)
Dept: PERIOP | Facility: HOSPITAL | Age: 59
End: 2019-03-07

## 2019-03-07 ENCOUNTER — HOSPITAL ENCOUNTER (OUTPATIENT)
Facility: HOSPITAL | Age: 59
Setting detail: HOSPITAL OUTPATIENT SURGERY
Discharge: HOME | End: 2019-03-07
Attending: SURGERY | Admitting: SURGERY

## 2019-03-07 ENCOUNTER — ANESTHESIA (OUTPATIENT)
Dept: PERIOP | Facility: HOSPITAL | Age: 59
End: 2019-03-07

## 2019-03-07 VITALS
DIASTOLIC BLOOD PRESSURE: 85 MMHG | TEMPERATURE: 97.4 F | OXYGEN SATURATION: 98 % | WEIGHT: 162 LBS | HEART RATE: 83 BPM | SYSTOLIC BLOOD PRESSURE: 131 MMHG | BODY MASS INDEX: 29.81 KG/M2 | RESPIRATION RATE: 18 BRPM | HEIGHT: 62 IN

## 2019-03-07 LAB — GLUCOSE BLDC GLUCOMTR-MCNC: 120 MG/DL (ref 70–130)

## 2019-03-07 PROCEDURE — 25010000002 PROPOFOL 10 MG/ML EMULSION: Performed by: NURSE ANESTHETIST, CERTIFIED REGISTERED

## 2019-03-07 PROCEDURE — 25010000002 FENTANYL CITRATE (PF) 100 MCG/2ML SOLUTION: Performed by: NURSE ANESTHETIST, CERTIFIED REGISTERED

## 2019-03-07 PROCEDURE — 25010000002 MIDAZOLAM PER 1 MG: Performed by: NURSE ANESTHETIST, CERTIFIED REGISTERED

## 2019-03-07 PROCEDURE — 25010000002 PROPOFOL 1000 MG/ML EMULSION: Performed by: NURSE ANESTHETIST, CERTIFIED REGISTERED

## 2019-03-07 PROCEDURE — 82962 GLUCOSE BLOOD TEST: CPT

## 2019-03-07 PROCEDURE — 45378 DIAGNOSTIC COLONOSCOPY: CPT | Performed by: SURGERY

## 2019-03-07 RX ORDER — LIDOCAINE HYDROCHLORIDE 20 MG/ML
INJECTION, SOLUTION INFILTRATION; PERINEURAL AS NEEDED
Status: DISCONTINUED | OUTPATIENT
Start: 2019-03-07 | End: 2019-03-07 | Stop reason: SURG

## 2019-03-07 RX ORDER — SODIUM CHLORIDE 0.9 % (FLUSH) 0.9 %
3 SYRINGE (ML) INJECTION EVERY 12 HOURS SCHEDULED
Status: DISCONTINUED | OUTPATIENT
Start: 2019-03-07 | End: 2019-03-07 | Stop reason: HOSPADM

## 2019-03-07 RX ORDER — PROPOFOL 10 MG/ML
VIAL (ML) INTRAVENOUS AS NEEDED
Status: DISCONTINUED | OUTPATIENT
Start: 2019-03-07 | End: 2019-03-07 | Stop reason: SURG

## 2019-03-07 RX ORDER — ONDANSETRON 2 MG/ML
4 INJECTION INTRAMUSCULAR; INTRAVENOUS ONCE AS NEEDED
Status: DISCONTINUED | OUTPATIENT
Start: 2019-03-07 | End: 2019-03-07 | Stop reason: HOSPADM

## 2019-03-07 RX ORDER — MIDAZOLAM HYDROCHLORIDE 1 MG/ML
1 INJECTION INTRAMUSCULAR; INTRAVENOUS
Status: DISCONTINUED | OUTPATIENT
Start: 2019-03-07 | End: 2019-03-07 | Stop reason: HOSPADM

## 2019-03-07 RX ORDER — FENTANYL CITRATE 50 UG/ML
INJECTION, SOLUTION INTRAMUSCULAR; INTRAVENOUS AS NEEDED
Status: DISCONTINUED | OUTPATIENT
Start: 2019-03-07 | End: 2019-03-07 | Stop reason: SURG

## 2019-03-07 RX ORDER — OXYCODONE HYDROCHLORIDE AND ACETAMINOPHEN 5; 325 MG/1; MG/1
1 TABLET ORAL ONCE AS NEEDED
Status: DISCONTINUED | OUTPATIENT
Start: 2019-03-07 | End: 2019-03-07 | Stop reason: HOSPADM

## 2019-03-07 RX ORDER — SODIUM CHLORIDE 0.9 % (FLUSH) 0.9 %
3-10 SYRINGE (ML) INJECTION AS NEEDED
Status: DISCONTINUED | OUTPATIENT
Start: 2019-03-07 | End: 2019-03-07 | Stop reason: HOSPADM

## 2019-03-07 RX ORDER — MIDAZOLAM HYDROCHLORIDE 1 MG/ML
INJECTION INTRAMUSCULAR; INTRAVENOUS AS NEEDED
Status: DISCONTINUED | OUTPATIENT
Start: 2019-03-07 | End: 2019-03-07 | Stop reason: SURG

## 2019-03-07 RX ORDER — MEPERIDINE HYDROCHLORIDE 25 MG/ML
12.5 INJECTION INTRAMUSCULAR; INTRAVENOUS; SUBCUTANEOUS
Status: DISCONTINUED | OUTPATIENT
Start: 2019-03-07 | End: 2019-03-07 | Stop reason: HOSPADM

## 2019-03-07 RX ORDER — MIDAZOLAM HYDROCHLORIDE 1 MG/ML
2 INJECTION INTRAMUSCULAR; INTRAVENOUS
Status: DISCONTINUED | OUTPATIENT
Start: 2019-03-07 | End: 2019-03-07 | Stop reason: HOSPADM

## 2019-03-07 RX ORDER — IPRATROPIUM BROMIDE AND ALBUTEROL SULFATE 2.5; .5 MG/3ML; MG/3ML
3 SOLUTION RESPIRATORY (INHALATION) ONCE AS NEEDED
Status: DISCONTINUED | OUTPATIENT
Start: 2019-03-07 | End: 2019-03-07 | Stop reason: HOSPADM

## 2019-03-07 RX ORDER — FENTANYL CITRATE 50 UG/ML
50 INJECTION, SOLUTION INTRAMUSCULAR; INTRAVENOUS
Status: DISCONTINUED | OUTPATIENT
Start: 2019-03-07 | End: 2019-03-07 | Stop reason: HOSPADM

## 2019-03-07 RX ORDER — SODIUM CHLORIDE, SODIUM LACTATE, POTASSIUM CHLORIDE, CALCIUM CHLORIDE 600; 310; 30; 20 MG/100ML; MG/100ML; MG/100ML; MG/100ML
125 INJECTION, SOLUTION INTRAVENOUS CONTINUOUS
Status: DISCONTINUED | OUTPATIENT
Start: 2019-03-07 | End: 2019-03-07 | Stop reason: HOSPADM

## 2019-03-07 RX ADMIN — MIDAZOLAM HYDROCHLORIDE 2 MG: 1 INJECTION, SOLUTION INTRAMUSCULAR; INTRAVENOUS at 07:30

## 2019-03-07 RX ADMIN — LIDOCAINE HYDROCHLORIDE 60 MG: 20 INJECTION, SOLUTION INFILTRATION; PERINEURAL at 07:32

## 2019-03-07 RX ADMIN — PROPOFOL 140 MCG/KG/MIN: 10 INJECTION, EMULSION INTRAVENOUS at 07:32

## 2019-03-07 RX ADMIN — SODIUM CHLORIDE, POTASSIUM CHLORIDE, SODIUM LACTATE AND CALCIUM CHLORIDE: 600; 310; 30; 20 INJECTION, SOLUTION INTRAVENOUS at 07:30

## 2019-03-07 RX ADMIN — PROPOFOL 30 MG: 10 INJECTION, EMULSION INTRAVENOUS at 07:32

## 2019-03-07 RX ADMIN — FENTANYL CITRATE 100 MCG: 50 INJECTION INTRAMUSCULAR; INTRAVENOUS at 07:30

## 2019-03-07 NOTE — ANESTHESIA PREPROCEDURE EVALUATION
Anesthesia Evaluation     NPO Solid Status: > 8 hours  NPO Liquid Status: > 8 hours           Airway   Mallampati: II  TM distance: >3 FB  Neck ROM: full  No difficulty expected  Dental - normal exam     Pulmonary - negative pulmonary ROS and normal exam   Cardiovascular - normal exam  Exercise tolerance: excellent (>7 METS)    NYHA Classification: I    (+) hypertension, hyperlipidemia,       Neuro/Psych- negative ROS  GI/Hepatic/Renal/Endo    (+)  GERD,  liver disease, diabetes mellitus,     Musculoskeletal (-) negative ROS    Abdominal  - normal exam   Substance History - negative use     OB/GYN negative ob/gyn ROS         Other - negative ROS                         Anesthesia Plan    ASA 3     general     intravenous and inhalational induction   Anesthetic plan, all risks, benefits, and alternatives have been provided, discussed and informed consent has been obtained with: patient.  Use of blood products discussed with patient  Consented to blood products.   Plan discussed with CRNA.

## 2019-03-07 NOTE — OP NOTE
COLONOSCOPY  Procedure Note    Perfecto Dozier  3/7/2019    Pre-op Diagnosis:   Pain of upper abdomen [R10.10]    Post-op Diagnosis:  same    Indications: R sided abdominal pain, need for screening colonoscopy    Procedure(s):  COLONOSCOPY    Surgeon(s):  Dawson Weinstein MD    Anesthesia: Choice    Staff:   Circulator: Florencia Martinez RN  Endo Technician: Naomi Rehman    Findings: normal colon, mild internal hemorrhoids    Operative Procedure: The patient was taken to the operating suite and placed in left lateral decubitus position.  Bilateral sequential compression devices were in place and IV anesthesia was administered.  Timeout procedure was performed.  Digital rectal examination was negative.  The colonoscope was inserted and advanced to the cecum as evidenced by the ileocecal valve and appendiceal orifice.  The bowel prep was excellent.  The colonoscope was slowly removed and the entirety of the colonic mucosa was evaluated.  Colonoscopic findings included internal hemorrhoids.  The colonoscope was then removed and the patient was awakened from anesthesia and taken recovery.  They tolerated the procedure well.    Estimated Blood Loss: minimal    Specimens:  none                  Drains: none    Grafts or Implants: none    Complications: None    Recommendations: screening colonoscopy in 10 years.    Dawson Weinstein MD     Date: 3/7/2019  Time: 7:52 AM

## 2019-03-07 NOTE — ANESTHESIA POSTPROCEDURE EVALUATION
Patient: Perfecto Dozier    Procedure Summary     Date:  03/07/19 Room / Location:  [facility] OR  /  [facility] OR    Anesthesia Start:  0730 Anesthesia Stop:  0749    Procedure:  COLONOSCOPY (N/A ) Diagnosis:       Pain of upper abdomen      (Pain of upper abdomen [R10.10])    Surgeon:  Dawson Weinstein MD Provider:  Atif Gilliland MD    Anesthesia Type:  general ASA Status:  3          Anesthesia Type: general  Last vitals  BP   131/85 (03/07/19 0821)   Temp   97.4 °F (36.3 °C) (03/07/19 0751)   Pulse   83 (03/07/19 0821)   Resp   18 (03/07/19 0821)     SpO2   98 % (03/07/19 0821)     Post Anesthesia Care and Evaluation    Patient location during evaluation: PHASE II  Patient participation: complete - patient participated  Level of consciousness: awake and alert  Pain score: 1  Pain management: adequate  Airway patency: patent  Anesthetic complications: No anesthetic complications  PONV Status: controlled  Cardiovascular status: acceptable  Respiratory status: acceptable  Hydration status: acceptable

## 2019-03-27 ENCOUNTER — OFFICE VISIT (OUTPATIENT)
Dept: SURGERY | Facility: CLINIC | Age: 59
End: 2019-03-27

## 2019-03-27 VITALS — HEIGHT: 62 IN | WEIGHT: 162 LBS | BODY MASS INDEX: 29.81 KG/M2

## 2019-03-27 DIAGNOSIS — K82.8 BILIARY DYSKINESIA: Primary | ICD-10-CM

## 2019-03-27 DIAGNOSIS — R10.10 PAIN OF UPPER ABDOMEN: ICD-10-CM

## 2019-03-27 PROCEDURE — 99212 OFFICE O/P EST SF 10 MIN: CPT | Performed by: SURGERY

## 2019-03-27 RX ORDER — POLYETHYLENE GLYCOL 3350 17 G/17G
17 POWDER, FOR SOLUTION ORAL 2 TIMES DAILY PRN
Qty: 30 EACH | Refills: 2 | Status: SHIPPED | OUTPATIENT
Start: 2019-03-27

## 2019-03-29 NOTE — PROGRESS NOTES
Subjective   Perfecto Dozier is a 58 y.o. female  is here today for follow-up.         Perfecto Dozier is a 58 y.o. female here for follow up for abdominal pain.  Her pain persist and is crampy in nature and worse after meals.  The only time she felt release was after colonoscopy where a bowel prep or been performed.  Suggest constipation is the source.  CT the abdomen pelvis is negative for any abnormality she has no evidence of surgical complication from her cholecystectomy.        Assessment     Perfecto was seen today for s/p colonoscopy and ruq abominal pain.    Diagnoses and all orders for this visit:    Biliary dyskinesia    Pain of upper abdomen    Other orders  -     polyethylene glycol (MIRALAX) packet; Take 17 g by mouth 2 (Two) Times a Day As Needed (constipation).      Perfecto Dozier is a 58 y.o. female with abdominal pain likely secondary to constipation.  She has been counseled on bowel regimen and she will follow-up in 2 weeks.

## 2019-08-15 ENCOUNTER — TRANSCRIBE ORDERS (OUTPATIENT)
Dept: ADMINISTRATIVE | Facility: HOSPITAL | Age: 59
End: 2019-08-15

## 2019-08-15 DIAGNOSIS — Z12.31 VISIT FOR SCREENING MAMMOGRAM: Primary | ICD-10-CM

## 2019-09-30 ENCOUNTER — HOSPITAL ENCOUNTER (OUTPATIENT)
Dept: MAMMOGRAPHY | Facility: HOSPITAL | Age: 59
Discharge: HOME | End: 2019-09-30
Admitting: INTERNAL MEDICINE

## 2019-09-30 DIAGNOSIS — Z12.31 VISIT FOR SCREENING MAMMOGRAM: ICD-10-CM

## 2019-09-30 PROCEDURE — 77063 BREAST TOMOSYNTHESIS BI: CPT | Performed by: RADIOLOGY

## 2019-09-30 PROCEDURE — 77067 SCR MAMMO BI INCL CAD: CPT

## 2019-09-30 PROCEDURE — 77067 SCR MAMMO BI INCL CAD: CPT | Performed by: RADIOLOGY

## 2019-09-30 PROCEDURE — 77063 BREAST TOMOSYNTHESIS BI: CPT

## 2020-08-21 ENCOUNTER — TRANSCRIBE ORDERS (OUTPATIENT)
Dept: ADMINISTRATIVE | Facility: HOSPITAL | Age: 60
End: 2020-08-21

## 2020-08-21 DIAGNOSIS — Z12.31 VISIT FOR SCREENING MAMMOGRAM: Primary | ICD-10-CM

## 2020-11-23 ENCOUNTER — HOSPITAL ENCOUNTER (OUTPATIENT)
Dept: MAMMOGRAPHY | Facility: HOSPITAL | Age: 60
Discharge: HOME | End: 2020-11-23
Admitting: INTERNAL MEDICINE

## 2020-11-23 DIAGNOSIS — Z12.31 VISIT FOR SCREENING MAMMOGRAM: ICD-10-CM

## 2020-11-23 PROCEDURE — 77063 BREAST TOMOSYNTHESIS BI: CPT

## 2020-11-23 PROCEDURE — 77067 SCR MAMMO BI INCL CAD: CPT

## 2020-11-23 PROCEDURE — 77067 SCR MAMMO BI INCL CAD: CPT | Performed by: RADIOLOGY

## 2020-11-23 PROCEDURE — 77063 BREAST TOMOSYNTHESIS BI: CPT | Performed by: RADIOLOGY

## 2021-02-25 DIAGNOSIS — Z23 IMMUNIZATION DUE: ICD-10-CM

## 2021-08-24 ENCOUNTER — APPOINTMENT (OUTPATIENT)
Dept: BONE DENSITY | Facility: HOSPITAL | Age: 61
End: 2021-08-24

## 2021-10-06 ENCOUNTER — TRANSCRIBE ORDERS (OUTPATIENT)
Dept: PREADMISSION TESTING | Facility: HOSPITAL | Age: 61
End: 2021-10-06

## 2021-10-06 DIAGNOSIS — Z12.31 BREAST CANCER SCREENING BY MAMMOGRAM: Primary | ICD-10-CM

## 2021-12-06 ENCOUNTER — HOSPITAL ENCOUNTER (OUTPATIENT)
Dept: MAMMOGRAPHY | Facility: HOSPITAL | Age: 61
Discharge: HOME | End: 2021-12-06
Admitting: INTERNAL MEDICINE

## 2021-12-06 DIAGNOSIS — Z12.31 BREAST CANCER SCREENING BY MAMMOGRAM: ICD-10-CM

## 2021-12-06 PROCEDURE — 77063 BREAST TOMOSYNTHESIS BI: CPT

## 2021-12-06 PROCEDURE — 77067 SCR MAMMO BI INCL CAD: CPT

## 2021-12-06 PROCEDURE — 77063 BREAST TOMOSYNTHESIS BI: CPT | Performed by: RADIOLOGY

## 2021-12-06 PROCEDURE — 77067 SCR MAMMO BI INCL CAD: CPT | Performed by: RADIOLOGY

## 2022-09-07 ENCOUNTER — TRANSCRIBE ORDERS (OUTPATIENT)
Dept: ADMINISTRATIVE | Facility: HOSPITAL | Age: 62
End: 2022-09-07

## 2022-09-07 DIAGNOSIS — Z12.31 ENCOUNTER FOR SCREENING MAMMOGRAM FOR BREAST CANCER: Primary | ICD-10-CM

## 2022-12-12 ENCOUNTER — HOSPITAL ENCOUNTER (OUTPATIENT)
Dept: MAMMOGRAPHY | Facility: HOSPITAL | Age: 62
Discharge: HOME | End: 2022-12-12
Admitting: INTERNAL MEDICINE

## 2022-12-12 DIAGNOSIS — Z12.31 ENCOUNTER FOR SCREENING MAMMOGRAM FOR BREAST CANCER: ICD-10-CM

## 2022-12-12 PROCEDURE — 77063 BREAST TOMOSYNTHESIS BI: CPT

## 2022-12-12 PROCEDURE — 77063 BREAST TOMOSYNTHESIS BI: CPT | Performed by: RADIOLOGY

## 2022-12-12 PROCEDURE — 77067 SCR MAMMO BI INCL CAD: CPT | Performed by: RADIOLOGY

## 2022-12-12 PROCEDURE — 77067 SCR MAMMO BI INCL CAD: CPT

## 2023-01-19 NOTE — PROGRESS NOTES
Subjective   Perfecto Dozier is a 58 y.o. female is being seen for follow-up    Perfecto Dozier is a 58 y.o. female With constipation and abdominal pain.  Her last colonoscopy was greater than 10 years ago.  The patient's pain has somewhat improved with PPI therapy.  She has undergone cholecystectomy with no evidence of complication postoperatively.  No blood per rectum.  No change in bowel caliber though her constipation is new.  This is somewhat improved with stool softeners and laxatives.  No family history of colon cancer.        Past Medical History:   Diagnosis Date   • Abdominal pain    • Diabetes mellitus (CMS/HCC)    • Elevated cholesterol    • GERD (gastroesophageal reflux disease)    • Heartburn    • Hypertension        Family History   Problem Relation Age of Onset   • Breast cancer Mother 58   • Breast cancer Maternal Aunt 62   • Ovarian cancer Neg Hx        Social History     Socioeconomic History   • Marital status:      Spouse name: Not on file   • Number of children: Not on file   • Years of education: Not on file   • Highest education level: Not on file   Social Needs   • Financial resource strain: Not on file   • Food insecurity - worry: Not on file   • Food insecurity - inability: Not on file   • Transportation needs - medical: Not on file   • Transportation needs - non-medical: Not on file   Occupational History   • Not on file   Tobacco Use   • Smoking status: Never Smoker   • Smokeless tobacco: Never Used   Substance and Sexual Activity   • Alcohol use: No   • Drug use: No   • Sexual activity: Defer     Birth control/protection: Post-menopausal, Surgical   Other Topics Concern   • Not on file   Social History Narrative   • Not on file       Past Surgical History:   Procedure Laterality Date   • ABDOMINAL SURGERY     • CHOLECYSTECTOMY N/A 9/14/2018    Procedure: CHOLECYSTECTOMY LAPAROSCOPIC , LIVER BIOPSY.;  Surgeon: Dawson Weinstein MD;  Location: [facility] OR;  Service: General   •  "COLONOSCOPY     • HYSTERECTOMY  01/2015    Complete   • MASS EXCISION       thyroid nodule many years ago    • OOPHORECTOMY Bilateral 01/2015    Complete   • THYROID SURGERY     • TUBAL ABDOMINAL LIGATION         Review of Systems   Constitutional: Negative for activity change, appetite change, chills and fever.   HENT: Negative for sore throat and trouble swallowing.    Eyes: Negative for visual disturbance.   Respiratory: Negative for cough and shortness of breath.    Cardiovascular: Negative for chest pain and palpitations.   Gastrointestinal: Positive for abdominal pain and constipation. Negative for abdominal distention, blood in stool, diarrhea, nausea and vomiting.   Endocrine: Negative for cold intolerance and heat intolerance.   Genitourinary: Negative for dysuria.   Musculoskeletal: Negative for joint swelling.   Skin: Negative for color change, rash and wound.   Allergic/Immunologic: Negative for immunocompromised state.   Neurological: Negative for dizziness, seizures, weakness and headaches.   Hematological: Negative for adenopathy. Does not bruise/bleed easily.   Psychiatric/Behavioral: Negative for agitation and confusion.         Ht 157.5 cm (62\")   Wt 73 kg (161 lb)   LMP  (LMP Unknown)   BMI 29.45 kg/m²   Objective   Physical Exam   Constitutional: She is oriented to person, place, and time. She appears well-developed.   HENT:   Head: Normocephalic and atraumatic.   Mouth/Throat: Mucous membranes are normal.   Eyes: Conjunctivae are normal. Pupils are equal, round, and reactive to light.   Neck: Neck supple. No JVD present. No tracheal deviation present. No thyromegaly present.   Cardiovascular: Normal rate and regular rhythm. Exam reveals no gallop and no friction rub.   No murmur heard.  Pulmonary/Chest: Effort normal and breath sounds normal.   Abdominal: Soft. She exhibits no distension. There is no splenomegaly or hepatomegaly. There is no tenderness. No hernia.   Musculoskeletal: Normal " range of motion. She exhibits no deformity.   Neurological: She is alert and oriented to person, place, and time.   Skin: Skin is warm and dry.   Psychiatric: She has a normal mood and affect.             Assessment   Perfecto was seen today for abdominal pain.    Diagnoses and all orders for this visit:    Pain of upper abdomen  -     Case Request; Standing  -     Case Request    Other orders  -     Follow anesthesia standing orders.  -     Provide NPO Instructions to Patient; Future  -     Follow anesthesia standing orders.; Standing  -     Verify NPO Status; Standing  -     Obtain informed consent; Standing  -     SCD (sequential compression device)- to be placed on patient in Pre-op; Standing  -     Verify / Perform Chlorhexidine Skin Prep if Indicated (If Not Already Completed); Standing      Perfecto Dozier is a 58 y.o. female with abdominal pain of unknown etiology.  She does have constipation and is due for screening colonoscopy and therefore will undergo diagnostic colonoscopy.  She understands risks and benefits of the procedure.    Patient's Body mass index is 29.45 kg/m². BMI is above normal parameters. Recommendations include: educational material.              Bexarotene Pregnancy And Lactation Text: This medication is Pregnancy Category X and should not be given to women who are pregnant or may become pregnant. This medication should not be used if you are breast feeding.

## 2023-10-02 ENCOUNTER — TRANSCRIBE ORDERS (OUTPATIENT)
Dept: ADMINISTRATIVE | Facility: HOSPITAL | Age: 63
End: 2023-10-02
Payer: MEDICARE

## 2023-10-02 DIAGNOSIS — Z78.0 ASYMPTOMATIC MENOPAUSAL STATE: Primary | ICD-10-CM

## 2023-10-09 ENCOUNTER — HOSPITAL ENCOUNTER (OUTPATIENT)
Dept: BONE DENSITY | Facility: HOSPITAL | Age: 63
Discharge: HOME | End: 2023-10-09
Payer: MEDICARE

## 2023-10-09 ENCOUNTER — TRANSCRIBE ORDERS (OUTPATIENT)
Dept: ADMINISTRATIVE | Facility: HOSPITAL | Age: 63
End: 2023-10-09
Payer: MEDICARE

## 2023-10-09 ENCOUNTER — HOSPITAL ENCOUNTER (OUTPATIENT)
Dept: GENERAL RADIOLOGY | Facility: HOSPITAL | Age: 63
Discharge: HOME | End: 2023-10-09
Payer: MEDICARE

## 2023-10-09 DIAGNOSIS — M25.562 PAIN IN JOINT OF LEFT KNEE: Primary | ICD-10-CM

## 2023-10-09 DIAGNOSIS — Z78.0 ASYMPTOMATIC MENOPAUSAL STATE: ICD-10-CM

## 2023-10-09 DIAGNOSIS — M25.562 PAIN IN JOINT OF LEFT KNEE: ICD-10-CM

## 2023-10-09 PROCEDURE — 73564 X-RAY EXAM KNEE 4 OR MORE: CPT

## 2023-10-09 PROCEDURE — 77080 DXA BONE DENSITY AXIAL: CPT

## 2023-10-09 PROCEDURE — 77080 DXA BONE DENSITY AXIAL: CPT | Performed by: RADIOLOGY

## 2023-10-09 PROCEDURE — 73564 X-RAY EXAM KNEE 4 OR MORE: CPT | Performed by: RADIOLOGY

## 2023-10-18 ENCOUNTER — TRANSCRIBE ORDERS (OUTPATIENT)
Dept: ADMINISTRATIVE | Facility: HOSPITAL | Age: 63
End: 2023-10-18
Payer: MEDICARE

## 2023-10-18 DIAGNOSIS — Z12.31 VISIT FOR SCREENING MAMMOGRAM: Primary | ICD-10-CM

## 2023-12-13 ENCOUNTER — HOSPITAL ENCOUNTER (OUTPATIENT)
Dept: MAMMOGRAPHY | Facility: HOSPITAL | Age: 63
Discharge: HOME | End: 2023-12-13
Admitting: INTERNAL MEDICINE
Payer: MEDICARE

## 2023-12-13 DIAGNOSIS — Z12.31 VISIT FOR SCREENING MAMMOGRAM: ICD-10-CM

## 2023-12-13 PROCEDURE — 77063 BREAST TOMOSYNTHESIS BI: CPT

## 2023-12-13 PROCEDURE — 77067 SCR MAMMO BI INCL CAD: CPT

## 2024-07-23 ENCOUNTER — OFFICE VISIT (OUTPATIENT)
Dept: GASTROENTEROLOGY | Facility: CLINIC | Age: 64
End: 2024-07-23
Payer: MEDICARE

## 2024-07-23 VITALS
WEIGHT: 143.2 LBS | DIASTOLIC BLOOD PRESSURE: 86 MMHG | BODY MASS INDEX: 26.35 KG/M2 | SYSTOLIC BLOOD PRESSURE: 153 MMHG | HEIGHT: 62 IN | HEART RATE: 84 BPM

## 2024-07-23 DIAGNOSIS — K58.1 IRRITABLE BOWEL SYNDROME WITH CONSTIPATION: ICD-10-CM

## 2024-07-23 DIAGNOSIS — K21.9 GASTROESOPHAGEAL REFLUX DISEASE, UNSPECIFIED WHETHER ESOPHAGITIS PRESENT: Primary | ICD-10-CM

## 2024-07-23 PROCEDURE — 1159F MED LIST DOCD IN RCRD: CPT

## 2024-07-23 PROCEDURE — 99204 OFFICE O/P NEW MOD 45 MIN: CPT

## 2024-07-23 PROCEDURE — 1160F RVW MEDS BY RX/DR IN RCRD: CPT

## 2024-07-23 RX ORDER — SOD SULF/POT CHLORIDE/MAG SULF 1.479 G
24 TABLET ORAL TAKE AS DIRECTED
Qty: 24 TABLET | Refills: 0 | Status: SHIPPED | OUTPATIENT
Start: 2024-07-23

## 2024-07-23 RX ORDER — EMPAGLIFLOZIN 25 MG/1
TABLET, FILM COATED ORAL
COMMUNITY
Start: 2024-05-28

## 2024-07-23 RX ORDER — HYDROXYZINE PAMOATE 25 MG/1
25 CAPSULE ORAL 3 TIMES DAILY PRN
COMMUNITY
Start: 2024-06-26

## 2024-07-23 RX ORDER — DULAGLUTIDE 3 MG/.5ML
INJECTION, SOLUTION SUBCUTANEOUS
COMMUNITY
Start: 2024-07-10

## 2024-07-23 RX ORDER — ESCITALOPRAM OXALATE 10 MG/1
10 TABLET ORAL DAILY
COMMUNITY
Start: 2024-06-26

## 2024-07-23 RX ORDER — PANTOPRAZOLE SODIUM 40 MG/1
40 TABLET, DELAYED RELEASE ORAL 2 TIMES DAILY
Qty: 60 TABLET | Refills: 5 | Status: SHIPPED | OUTPATIENT
Start: 2024-07-23

## 2024-07-23 NOTE — PROGRESS NOTES
Date of Consultation:  7/23/2024  Referring Physician: No ref. provider found    Chief Complaint  Abdominal Pain (Right side) and Rectal Bleeding    Perfecto Dozier is a 64 y.o. female who presents today to [facility] for Abdominal Pain (Right side) and Rectal Bleeding.    HPI:   64-year-old female with PMH DM2 on Trulicity GERD who presents today for evaluation of abdominal pain and rectal bleeding.  Patient reports that she has also noticed worsening constipation.  States that she will typically go 4-5 days between bowel movements and when she does have a bowel movement they are typically small volume.  She rates her BSS is 4.  She endorses incomplete evacuation and excessive straining.  She takes senna as needed but states that this does not help much.  She has noted blood on the toilet paper when she wipes.  States that she has right lower quadrant pain that radiates into her groin.  Describes the pain as intermittent, sharp and occurring randomly.  It does seem to be alleviated after bowel movements.  She is often nauseated in the morning especially if she drinks a second cup of coffee.  States that she will occasionally vomit but its mostly stomach acid.  She reports flares once every other week on Protonix 40 mg once daily.  She is s/p cholecystectomy.  She had a colonoscopy performed in 3193-4737.  Of note, she does have a sister who was diagnosed with colon cancer at age 64.  She denies unintentional weight loss, nausea, vomiting, hematemesis, and melena.        Previous History:   Past Medical History:   Diagnosis Date    Abdominal pain     Diabetes mellitus     Elevated cholesterol     GERD (gastroesophageal reflux disease)     Heartburn     Hypertension       Past Surgical History:   Procedure Laterality Date    ABDOMINAL SURGERY      CHOLECYSTECTOMY N/A 09/14/2018    Procedure: CHOLECYSTECTOMY LAPAROSCOPIC , LIVER BIOPSY.;  Surgeon: Dawson Weinstein MD;   Location:  [facility] OR;  Service: General    COLONOSCOPY      12 years ago    COLONOSCOPY N/A 03/07/2019    Procedure: COLONOSCOPY;  Surgeon: Dawson Weinstein MD;  Location: [facility] OR;  Service: Gastroenterology    HYSTERECTOMY  01/2015    Complete    MASS EXCISION       thyroid nodule many years ago     OOPHORECTOMY Bilateral 01/2015    Complete    THYROID SURGERY      TUBAL ABDOMINAL LIGATION        Social History     Socioeconomic History    Marital status:    Tobacco Use    Smoking status: Never    Smokeless tobacco: Never   Vaping Use    Vaping status: Never Used   Substance and Sexual Activity    Alcohol use: No    Drug use: No    Sexual activity: Defer     Birth control/protection: Post-menopausal, Surgical        Current Medications:  Current Outpatient Medications   Medication Sig Dispense Refill    Alogliptin-Metformin HCl 12.5-500 MG tablet Take  by mouth 2 (Two) Times a Day.      Canagliflozin (INVOKANA) 300 MG tablet Take 1 tablet by mouth Daily.      Cholecalciferol (VITAMIN D-3) 5000 units tablet Take 1 tablet by mouth 1 (One) Time Per Week.      escitalopram (LEXAPRO) 10 MG tablet Take 1 tablet by mouth Daily.      hydrOXYzine pamoate (VISTARIL) 25 MG capsule Take 1 capsule by mouth 3 (Three) Times a Day As Needed for Anxiety.      Jardiance 25 MG tablet tablet TAKE ONE TABLET BY MOUTH EVERY MORNING FOR SUGAR      losartan (COZAAR) 50 MG tablet Take  by mouth Daily.      pantoprazole (Protonix) 40 MG EC tablet Take 1 tablet by mouth 2 (Two) Times a Day. 60 tablet 5    polyethylene glycol (MIRALAX) packet Take 17 g by mouth 2 (Two) Times a Day As Needed (constipation). 30 each 2    simvastatin (ZOCOR) 10 MG tablet Take  by mouth Daily.      Trulicity 3 MG/0.5ML solution pen-injector       cetirizine (zyrTEC) 10 MG tablet Take 10 mg by mouth Daily. (Patient not taking: Reported on 7/23/2024)      linaclotide (Linzess) 145 MCG capsule capsule Take 1 capsule by mouth Daily. 30 minutes before  "meals on an empty stomach. 30 capsule 5    Sodium Sulfate-Mag Sulfate-KCl (Sutab) 4298-172-222 MG tablet Take 24 tablets by mouth Take As Directed. Take 12 tablets at 4 PM with 16 ounces of water and repeat at 10 PM 24 tablet 0     No current facility-administered medications for this visit.       Allergies:   Allergies   Allergen Reactions    Penicillins Hives and Swelling       Vitals:   /86   Pulse 84   Ht 157.5 cm (62\")   Wt 65 kg (143 lb 3.2 oz)   LMP  (LMP Unknown)   BMI 26.19 kg/m²   Estimated body mass index is 26.19 kg/m² as calculated from the following:    Height as of this encounter: 157.5 cm (62\").    Weight as of this encounter: 65 kg (143 lb 3.2 oz).    Perfecto Dozier  reports that she has never smoked. She has never used smokeless tobacco. I have educated her on the risk of diseases from using tobacco products such as cancer, COPD, and heart disease.         ROS:   Review of Systems   Constitutional: Negative.    HENT: Negative.  Negative for trouble swallowing.    Respiratory: Negative.     Cardiovascular: Negative.    Gastrointestinal:  Positive for abdominal pain, anal bleeding, constipation, nausea and vomiting.   All other systems reviewed and are negative.       Physical Exam:   Physical Exam  Vitals reviewed.   Constitutional:       Appearance: Normal appearance.   HENT:      Head: Normocephalic and atraumatic.      Nose: Nose normal.      Mouth/Throat:      Mouth: Mucous membranes are moist.   Eyes:      Extraocular Movements: Extraocular movements intact.   Cardiovascular:      Rate and Rhythm: Normal rate and regular rhythm.      Pulses: Normal pulses.      Heart sounds: Normal heart sounds.   Pulmonary:      Effort: Pulmonary effort is normal.      Breath sounds: Normal breath sounds.   Abdominal:      General: Abdomen is flat. Bowel sounds are normal. There is no distension.      Palpations: Abdomen is soft. There is no mass.      Tenderness: There is no abdominal tenderness. " There is no guarding or rebound.      Hernia: No hernia is present.   Skin:     General: Skin is warm and dry.   Neurological:      Mental Status: She is alert and oriented to person, place, and time.   Psychiatric:         Mood and Affect: Mood normal.         Behavior: Behavior normal.         Thought Content: Thought content normal.          Lab Results:   Lab Results   Component Value Date    WBC 9.59 01/30/2019    HGB 14.1 01/30/2019    HCT 44.9 01/30/2019    MCV 82.7 01/30/2019    RDW 14.8 (H) 01/30/2019     01/30/2019    NEUTRORELPCT 60.6 01/30/2019    LYMPHORELPCT 29.5 01/30/2019    MONORELPCT 7.1 01/30/2019    EOSRELPCT 1.7 01/30/2019    BASORELPCT 0.5 01/30/2019    NEUTROABS 5.81 01/30/2019    LYMPHSABS 2.83 01/30/2019       Lab Results   Component Value Date     01/30/2019    K 3.9 01/30/2019    CO2 25.0 01/30/2019     01/30/2019    BUN 15 01/30/2019    CREATININE 0.64 01/30/2019    EGFRIFNONA 95 01/30/2019    GLUCOSE 110 01/30/2019    CALCIUM 10.1 (H) 01/30/2019    ALKPHOS 111 (H) 01/30/2019    AST 17 01/30/2019    ALT 19 01/30/2019    BILITOT 0.3 01/30/2019    ALBUMIN 4.70 01/30/2019    PROTEINTOT 7.5 01/30/2019       Pathology:        Endoscopy:        Imaging:           Results review: During today's encounter, all relevant clinical data has been reviewed.      Assessment and Plan  1. Gastroesophageal reflux disease, unspecified whether esophagitis present (Primary)  Given reported nausea and vomiting of stomach acid especially with increased caffeine intake, largely suspect acid reflux is not well-controlled.  Discussed management for GERD: encouraged weight loss, HOB elevation at night, avoidance of meals 2-3 hours before bedtime, avoid trigger foods (caffeine, alcohol, chocolate, acidic/spicy foods e.g oranges/tomatoes), and encouraged smoking cessation  Will increase Protonix to 40 mg twice daily.  Rx sent.  -     pantoprazole (Protonix) 40 MG EC tablet; Take 1 tablet by mouth 2  (Two) Times a Day.  Dispense: 60 tablet; Refill: 5    2. Irritable bowel syndrome with constipation   3.BRBPR  4. Family hx of Colon cancer  Due toBRBPR positive family history of colon cancer, will repeat colonoscopy for further evaluation management above.  -     Case Request; Standing  -     Follow Anesthesia Guidelines / Protocol; Standing  -     Obtain Informed Consent; Standing  Will send Linzess 145 mcg capsules for constipation.  Rx sent.  -     linaclotide (Linzess) 145 MCG capsule capsule; Take 1 capsule by mouth Daily. 30 minutes before meals on an empty stomach.  Dispense: 30 capsule; Refill: 5  -     Case Request      New Medications:   New Medications Ordered This Visit   Medications    Sodium Sulfate-Mag Sulfate-KCl (Sutab) 7614-009-349 MG tablet     Sig: Take 24 tablets by mouth Take As Directed. Take 12 tablets at 4 PM with 16 ounces of water and repeat at 10 PM     Dispense:  24 tablet     Refill:  0    pantoprazole (Protonix) 40 MG EC tablet     Sig: Take 1 tablet by mouth 2 (Two) Times a Day.     Dispense:  60 tablet     Refill:  5    linaclotide (Linzess) 145 MCG capsule capsule     Sig: Take 1 capsule by mouth Daily. 30 minutes before meals on an empty stomach.     Dispense:  30 capsule     Refill:  5       Discontinued Medications:   Medications Discontinued During This Encounter   Medication Reason    pantoprazole (PROTONIX) 40 MG EC tablet Reorder        Visit Diagnoses:    ICD-10-CM ICD-9-CM   1. Gastroesophageal reflux disease, unspecified whether esophagitis present  K21.9 530.81   2. Irritable bowel syndrome with constipation  K58.1 564.1            Follow Up:   Return in about 8 weeks (around 9/17/2024).    The patient was in agreement with the plan and all questions were answered to patient's satisfaction.        This document has been electronically signed by Marsha Lai PA-C   July 23, 2024 09:03 EDT    Dictated Utilizing Dragon Dictation: Part of this note may be an electronic  transcription/translation of spoken language to printed text using the Dragon Dictation System.    CC:  No ref. provider found  Heri Ramires MD

## 2024-07-23 NOTE — H&P (VIEW-ONLY)
Date of Consultation:  7/23/2024  Referring Physician: No ref. provider found    Chief Complaint  Abdominal Pain (Right side) and Rectal Bleeding    Perfecto Dozier is a 64 y.o. female who presents today to [facility] for Abdominal Pain (Right side) and Rectal Bleeding.    HPI:   64-year-old female with PMH DM2 on Trulicity GERD who presents today for evaluation of abdominal pain and rectal bleeding.  Patient reports that she has also noticed worsening constipation.  States that she will typically go 4-5 days between bowel movements and when she does have a bowel movement they are typically small volume.  She rates her BSS is 4.  She endorses incomplete evacuation and excessive straining.  She takes senna as needed but states that this does not help much.  She has noted blood on the toilet paper when she wipes.  States that she has right lower quadrant pain that radiates into her groin.  Describes the pain as intermittent, sharp and occurring randomly.  It does seem to be alleviated after bowel movements.  She is often nauseated in the morning especially if she drinks a second cup of coffee.  States that she will occasionally vomit but its mostly stomach acid.  She reports flares once every other week on Protonix 40 mg once daily.  She is s/p cholecystectomy.  She had a colonoscopy performed in 3255-1510.  Of note, she does have a sister who was diagnosed with colon cancer at age 64.  She denies unintentional weight loss, nausea, vomiting, hematemesis, and melena.        Previous History:   Past Medical History:   Diagnosis Date    Abdominal pain     Diabetes mellitus     Elevated cholesterol     GERD (gastroesophageal reflux disease)     Heartburn     Hypertension       Past Surgical History:   Procedure Laterality Date    ABDOMINAL SURGERY      CHOLECYSTECTOMY N/A 09/14/2018    Procedure: CHOLECYSTECTOMY LAPAROSCOPIC , LIVER BIOPSY.;  Surgeon: Dawson Weinstein MD;   Location:  [facility] OR;  Service: General    COLONOSCOPY      12 years ago    COLONOSCOPY N/A 03/07/2019    Procedure: COLONOSCOPY;  Surgeon: Dawson Weinstein MD;  Location: [facility] OR;  Service: Gastroenterology    HYSTERECTOMY  01/2015    Complete    MASS EXCISION       thyroid nodule many years ago     OOPHORECTOMY Bilateral 01/2015    Complete    THYROID SURGERY      TUBAL ABDOMINAL LIGATION        Social History     Socioeconomic History    Marital status:    Tobacco Use    Smoking status: Never    Smokeless tobacco: Never   Vaping Use    Vaping status: Never Used   Substance and Sexual Activity    Alcohol use: No    Drug use: No    Sexual activity: Defer     Birth control/protection: Post-menopausal, Surgical        Current Medications:  Current Outpatient Medications   Medication Sig Dispense Refill    Alogliptin-Metformin HCl 12.5-500 MG tablet Take  by mouth 2 (Two) Times a Day.      Canagliflozin (INVOKANA) 300 MG tablet Take 1 tablet by mouth Daily.      Cholecalciferol (VITAMIN D-3) 5000 units tablet Take 1 tablet by mouth 1 (One) Time Per Week.      escitalopram (LEXAPRO) 10 MG tablet Take 1 tablet by mouth Daily.      hydrOXYzine pamoate (VISTARIL) 25 MG capsule Take 1 capsule by mouth 3 (Three) Times a Day As Needed for Anxiety.      Jardiance 25 MG tablet tablet TAKE ONE TABLET BY MOUTH EVERY MORNING FOR SUGAR      losartan (COZAAR) 50 MG tablet Take  by mouth Daily.      pantoprazole (Protonix) 40 MG EC tablet Take 1 tablet by mouth 2 (Two) Times a Day. 60 tablet 5    polyethylene glycol (MIRALAX) packet Take 17 g by mouth 2 (Two) Times a Day As Needed (constipation). 30 each 2    simvastatin (ZOCOR) 10 MG tablet Take  by mouth Daily.      Trulicity 3 MG/0.5ML solution pen-injector       cetirizine (zyrTEC) 10 MG tablet Take 10 mg by mouth Daily. (Patient not taking: Reported on 7/23/2024)      linaclotide (Linzess) 145 MCG capsule capsule Take 1 capsule by mouth Daily. 30 minutes before  "meals on an empty stomach. 30 capsule 5    Sodium Sulfate-Mag Sulfate-KCl (Sutab) 1276-078-631 MG tablet Take 24 tablets by mouth Take As Directed. Take 12 tablets at 4 PM with 16 ounces of water and repeat at 10 PM 24 tablet 0     No current facility-administered medications for this visit.       Allergies:   Allergies   Allergen Reactions    Penicillins Hives and Swelling       Vitals:   /86   Pulse 84   Ht 157.5 cm (62\")   Wt 65 kg (143 lb 3.2 oz)   LMP  (LMP Unknown)   BMI 26.19 kg/m²   Estimated body mass index is 26.19 kg/m² as calculated from the following:    Height as of this encounter: 157.5 cm (62\").    Weight as of this encounter: 65 kg (143 lb 3.2 oz).    Perfecto Dozier  reports that she has never smoked. She has never used smokeless tobacco. I have educated her on the risk of diseases from using tobacco products such as cancer, COPD, and heart disease.         ROS:   Review of Systems   Constitutional: Negative.    HENT: Negative.  Negative for trouble swallowing.    Respiratory: Negative.     Cardiovascular: Negative.    Gastrointestinal:  Positive for abdominal pain, anal bleeding, constipation, nausea and vomiting.   All other systems reviewed and are negative.       Physical Exam:   Physical Exam  Vitals reviewed.   Constitutional:       Appearance: Normal appearance.   HENT:      Head: Normocephalic and atraumatic.      Nose: Nose normal.      Mouth/Throat:      Mouth: Mucous membranes are moist.   Eyes:      Extraocular Movements: Extraocular movements intact.   Cardiovascular:      Rate and Rhythm: Normal rate and regular rhythm.      Pulses: Normal pulses.      Heart sounds: Normal heart sounds.   Pulmonary:      Effort: Pulmonary effort is normal.      Breath sounds: Normal breath sounds.   Abdominal:      General: Abdomen is flat. Bowel sounds are normal. There is no distension.      Palpations: Abdomen is soft. There is no mass.      Tenderness: There is no abdominal tenderness. " There is no guarding or rebound.      Hernia: No hernia is present.   Skin:     General: Skin is warm and dry.   Neurological:      Mental Status: She is alert and oriented to person, place, and time.   Psychiatric:         Mood and Affect: Mood normal.         Behavior: Behavior normal.         Thought Content: Thought content normal.          Lab Results:   Lab Results   Component Value Date    WBC 9.59 01/30/2019    HGB 14.1 01/30/2019    HCT 44.9 01/30/2019    MCV 82.7 01/30/2019    RDW 14.8 (H) 01/30/2019     01/30/2019    NEUTRORELPCT 60.6 01/30/2019    LYMPHORELPCT 29.5 01/30/2019    MONORELPCT 7.1 01/30/2019    EOSRELPCT 1.7 01/30/2019    BASORELPCT 0.5 01/30/2019    NEUTROABS 5.81 01/30/2019    LYMPHSABS 2.83 01/30/2019       Lab Results   Component Value Date     01/30/2019    K 3.9 01/30/2019    CO2 25.0 01/30/2019     01/30/2019    BUN 15 01/30/2019    CREATININE 0.64 01/30/2019    EGFRIFNONA 95 01/30/2019    GLUCOSE 110 01/30/2019    CALCIUM 10.1 (H) 01/30/2019    ALKPHOS 111 (H) 01/30/2019    AST 17 01/30/2019    ALT 19 01/30/2019    BILITOT 0.3 01/30/2019    ALBUMIN 4.70 01/30/2019    PROTEINTOT 7.5 01/30/2019       Pathology:        Endoscopy:        Imaging:           Results review: During today's encounter, all relevant clinical data has been reviewed.      Assessment and Plan  1. Gastroesophageal reflux disease, unspecified whether esophagitis present (Primary)  Given reported nausea and vomiting of stomach acid especially with increased caffeine intake, largely suspect acid reflux is not well-controlled.  Discussed management for GERD: encouraged weight loss, HOB elevation at night, avoidance of meals 2-3 hours before bedtime, avoid trigger foods (caffeine, alcohol, chocolate, acidic/spicy foods e.g oranges/tomatoes), and encouraged smoking cessation  Will increase Protonix to 40 mg twice daily.  Rx sent.  -     pantoprazole (Protonix) 40 MG EC tablet; Take 1 tablet by mouth 2  (Two) Times a Day.  Dispense: 60 tablet; Refill: 5    2. Irritable bowel syndrome with constipation   3.BRBPR  4. Family hx of Colon cancer  Due toBRBPR positive family history of colon cancer, will repeat colonoscopy for further evaluation management above.  -     Case Request; Standing  -     Follow Anesthesia Guidelines / Protocol; Standing  -     Obtain Informed Consent; Standing  Will send Linzess 145 mcg capsules for constipation.  Rx sent.  -     linaclotide (Linzess) 145 MCG capsule capsule; Take 1 capsule by mouth Daily. 30 minutes before meals on an empty stomach.  Dispense: 30 capsule; Refill: 5  -     Case Request      New Medications:   New Medications Ordered This Visit   Medications    Sodium Sulfate-Mag Sulfate-KCl (Sutab) 9413-965-848 MG tablet     Sig: Take 24 tablets by mouth Take As Directed. Take 12 tablets at 4 PM with 16 ounces of water and repeat at 10 PM     Dispense:  24 tablet     Refill:  0    pantoprazole (Protonix) 40 MG EC tablet     Sig: Take 1 tablet by mouth 2 (Two) Times a Day.     Dispense:  60 tablet     Refill:  5    linaclotide (Linzess) 145 MCG capsule capsule     Sig: Take 1 capsule by mouth Daily. 30 minutes before meals on an empty stomach.     Dispense:  30 capsule     Refill:  5       Discontinued Medications:   Medications Discontinued During This Encounter   Medication Reason    pantoprazole (PROTONIX) 40 MG EC tablet Reorder        Visit Diagnoses:    ICD-10-CM ICD-9-CM   1. Gastroesophageal reflux disease, unspecified whether esophagitis present  K21.9 530.81   2. Irritable bowel syndrome with constipation  K58.1 564.1            Follow Up:   Return in about 8 weeks (around 9/17/2024).    The patient was in agreement with the plan and all questions were answered to patient's satisfaction.        This document has been electronically signed by Marsha Lai PA-C   July 23, 2024 09:03 EDT    Dictated Utilizing Dragon Dictation: Part of this note may be an electronic  transcription/translation of spoken language to printed text using the Dragon Dictation System.    CC:  No ref. provider found  Heri Ramires MD

## 2024-07-24 PROBLEM — K58.1 IRRITABLE BOWEL SYNDROME WITH CONSTIPATION: Status: ACTIVE | Noted: 2024-07-23

## 2024-08-01 ENCOUNTER — ANESTHESIA (OUTPATIENT)
Dept: PERIOP | Facility: HOSPITAL | Age: 64
End: 2024-08-01
Payer: MEDICARE

## 2024-08-01 ENCOUNTER — ANESTHESIA EVENT (OUTPATIENT)
Dept: PERIOP | Facility: HOSPITAL | Age: 64
End: 2024-08-01
Payer: MEDICARE

## 2024-08-01 ENCOUNTER — HOSPITAL ENCOUNTER (OUTPATIENT)
Facility: HOSPITAL | Age: 64
Setting detail: HOSPITAL OUTPATIENT SURGERY
Discharge: HOME | End: 2024-08-01
Attending: INTERNAL MEDICINE | Admitting: INTERNAL MEDICINE
Payer: MEDICARE

## 2024-08-01 VITALS
OXYGEN SATURATION: 98 % | WEIGHT: 142 LBS | HEART RATE: 70 BPM | TEMPERATURE: 97.1 F | HEIGHT: 62 IN | DIASTOLIC BLOOD PRESSURE: 78 MMHG | RESPIRATION RATE: 18 BRPM | SYSTOLIC BLOOD PRESSURE: 136 MMHG | BODY MASS INDEX: 26.13 KG/M2

## 2024-08-01 DIAGNOSIS — K58.1 IRRITABLE BOWEL SYNDROME WITH CONSTIPATION: ICD-10-CM

## 2024-08-01 LAB — GLUCOSE BLDC GLUCOMTR-MCNC: 122 MG/DL (ref 70–130)

## 2024-08-01 PROCEDURE — 25810000003 LACTATED RINGERS PER 1000 ML: Performed by: ANESTHESIOLOGY

## 2024-08-01 PROCEDURE — 82948 REAGENT STRIP/BLOOD GLUCOSE: CPT

## 2024-08-01 PROCEDURE — 25010000002 PROPOFOL 200 MG/20ML EMULSION: Performed by: NURSE ANESTHETIST, CERTIFIED REGISTERED

## 2024-08-01 RX ORDER — OXYCODONE HYDROCHLORIDE AND ACETAMINOPHEN 5; 325 MG/1; MG/1
1 TABLET ORAL ONCE AS NEEDED
Status: DISCONTINUED | OUTPATIENT
Start: 2024-08-01 | End: 2024-08-01 | Stop reason: HOSPADM

## 2024-08-01 RX ORDER — LIDOCAINE HYDROCHLORIDE 20 MG/ML
INJECTION, SOLUTION EPIDURAL; INFILTRATION; INTRACAUDAL; PERINEURAL AS NEEDED
Status: DISCONTINUED | OUTPATIENT
Start: 2024-08-01 | End: 2024-08-01 | Stop reason: SURG

## 2024-08-01 RX ORDER — SODIUM CHLORIDE 0.9 % (FLUSH) 0.9 %
10 SYRINGE (ML) INJECTION EVERY 12 HOURS SCHEDULED
Status: DISCONTINUED | OUTPATIENT
Start: 2024-08-01 | End: 2024-08-01 | Stop reason: HOSPADM

## 2024-08-01 RX ORDER — IPRATROPIUM BROMIDE AND ALBUTEROL SULFATE 2.5; .5 MG/3ML; MG/3ML
3 SOLUTION RESPIRATORY (INHALATION) ONCE AS NEEDED
Status: DISCONTINUED | OUTPATIENT
Start: 2024-08-01 | End: 2024-08-01 | Stop reason: HOSPADM

## 2024-08-01 RX ORDER — MEPERIDINE HYDROCHLORIDE 25 MG/ML
12.5 INJECTION INTRAMUSCULAR; INTRAVENOUS; SUBCUTANEOUS
Status: DISCONTINUED | OUTPATIENT
Start: 2024-08-01 | End: 2024-08-01 | Stop reason: HOSPADM

## 2024-08-01 RX ORDER — SODIUM CHLORIDE 9 MG/ML
40 INJECTION, SOLUTION INTRAVENOUS AS NEEDED
Status: DISCONTINUED | OUTPATIENT
Start: 2024-08-01 | End: 2024-08-01 | Stop reason: HOSPADM

## 2024-08-01 RX ORDER — SODIUM CHLORIDE 0.9 % (FLUSH) 0.9 %
10 SYRINGE (ML) INJECTION AS NEEDED
Status: DISCONTINUED | OUTPATIENT
Start: 2024-08-01 | End: 2024-08-01 | Stop reason: HOSPADM

## 2024-08-01 RX ORDER — SODIUM CHLORIDE 9 MG/ML
40 INJECTION, SOLUTION INTRAVENOUS AS NEEDED
Status: DISCONTINUED | OUTPATIENT
Start: 2024-08-01 | End: 2024-08-01

## 2024-08-01 RX ORDER — SODIUM CHLORIDE, SODIUM LACTATE, POTASSIUM CHLORIDE, CALCIUM CHLORIDE 600; 310; 30; 20 MG/100ML; MG/100ML; MG/100ML; MG/100ML
100 INJECTION, SOLUTION INTRAVENOUS ONCE AS NEEDED
Status: DISCONTINUED | OUTPATIENT
Start: 2024-08-01 | End: 2024-08-01 | Stop reason: HOSPADM

## 2024-08-01 RX ORDER — SODIUM CHLORIDE, SODIUM LACTATE, POTASSIUM CHLORIDE, CALCIUM CHLORIDE 600; 310; 30; 20 MG/100ML; MG/100ML; MG/100ML; MG/100ML
125 INJECTION, SOLUTION INTRAVENOUS ONCE
Status: COMPLETED | OUTPATIENT
Start: 2024-08-01 | End: 2024-08-01

## 2024-08-01 RX ORDER — FENTANYL CITRATE 50 UG/ML
50 INJECTION, SOLUTION INTRAMUSCULAR; INTRAVENOUS
Status: DISCONTINUED | OUTPATIENT
Start: 2024-08-01 | End: 2024-08-01 | Stop reason: HOSPADM

## 2024-08-01 RX ORDER — ONDANSETRON 2 MG/ML
4 INJECTION INTRAMUSCULAR; INTRAVENOUS AS NEEDED
Status: DISCONTINUED | OUTPATIENT
Start: 2024-08-01 | End: 2024-08-01 | Stop reason: HOSPADM

## 2024-08-01 RX ORDER — MIDAZOLAM HYDROCHLORIDE 1 MG/ML
0.5 INJECTION INTRAMUSCULAR; INTRAVENOUS
Status: DISCONTINUED | OUTPATIENT
Start: 2024-08-01 | End: 2024-08-01

## 2024-08-01 RX ORDER — SODIUM CHLORIDE, SODIUM LACTATE, POTASSIUM CHLORIDE, CALCIUM CHLORIDE 600; 310; 30; 20 MG/100ML; MG/100ML; MG/100ML; MG/100ML
125 INJECTION, SOLUTION INTRAVENOUS ONCE
Status: DISCONTINUED | OUTPATIENT
Start: 2024-08-01 | End: 2024-08-01

## 2024-08-01 RX ORDER — PROPOFOL 10 MG/ML
INJECTION, EMULSION INTRAVENOUS AS NEEDED
Status: DISCONTINUED | OUTPATIENT
Start: 2024-08-01 | End: 2024-08-01 | Stop reason: SURG

## 2024-08-01 RX ORDER — MIDAZOLAM HYDROCHLORIDE 1 MG/ML
0.5 INJECTION INTRAMUSCULAR; INTRAVENOUS
Status: DISCONTINUED | OUTPATIENT
Start: 2024-08-01 | End: 2024-08-01 | Stop reason: HOSPADM

## 2024-08-01 RX ADMIN — SODIUM CHLORIDE, POTASSIUM CHLORIDE, SODIUM LACTATE AND CALCIUM CHLORIDE: 600; 310; 30; 20 INJECTION, SOLUTION INTRAVENOUS at 08:45

## 2024-08-01 RX ADMIN — LIDOCAINE HYDROCHLORIDE 60 MG: 20 INJECTION, SOLUTION EPIDURAL; INFILTRATION; INTRACAUDAL; PERINEURAL at 08:47

## 2024-08-01 RX ADMIN — PROPOFOL 100 MG: 10 INJECTION, EMULSION INTRAVENOUS at 08:47

## 2024-08-01 RX ADMIN — PROPOFOL 50 MG: 10 INJECTION, EMULSION INTRAVENOUS at 09:00

## 2024-08-01 RX ADMIN — PROPOFOL 50 MG: 10 INJECTION, EMULSION INTRAVENOUS at 08:50

## 2024-08-01 RX ADMIN — PROPOFOL 50 MG: 10 INJECTION, EMULSION INTRAVENOUS at 08:55

## 2024-08-01 NOTE — ANESTHESIA POSTPROCEDURE EVALUATION
Patient: Perfecto Dozier    Procedure Summary       Date: 08/01/24 Room / Location: [facility] OR [address] [place] [address] [facility] OR    Anesthesia Start: 0845 Anesthesia Stop: 0905    Procedure: COLONOSCOPY Diagnosis:       Irritable bowel syndrome with constipation      (Irritable bowel syndrome with constipation [K58.1])    Surgeons: Candice Eller MD Provider: Atif Gilliland MD    Anesthesia Type: general ASA Status: 3            Anesthesia Type: general    Vitals  Vitals Value Taken Time   /74 08/01/24 0907   Temp 97.1 °F (36.2 °C) 08/01/24 0907   Pulse 71 08/01/24 0907   Resp 18 08/01/24 0907   SpO2 97 % 08/01/24 0907           Post Anesthesia Care and Evaluation    Patient location during evaluation: PHASE II  Patient participation: complete - patient participated  Level of consciousness: awake and alert  Pain score: 1  Pain management: adequate    Airway patency: patent  Anesthetic complications: No anesthetic complications  PONV Status: controlled  Cardiovascular status: acceptable  Respiratory status: acceptable  Hydration status: acceptable

## 2024-08-01 NOTE — ANESTHESIA PREPROCEDURE EVALUATION
Anesthesia Evaluation     no history of anesthetic complications:   NPO Solid Status: > 8 hours  NPO Liquid Status: > 8 hours           Airway   Mallampati: II  TM distance: >3 FB  Neck ROM: full  No difficulty expected  Dental - normal exam     Pulmonary - negative pulmonary ROS and normal exam   Cardiovascular - normal exam  Exercise tolerance: excellent (>7 METS)    NYHA Classification: I    (+) hypertension, hyperlipidemia      Neuro/Psych  (+) psychiatric history Depression  GI/Hepatic/Renal/Endo    (+) GERD, liver disease, diabetes mellitus    Musculoskeletal (-) negative ROS    Abdominal  - normal exam   Substance History - negative use     OB/GYN negative ob/gyn ROS         Other - negative ROS       ROS/Med Hx Other: Trulicity 7/23   IBS                Anesthesia Plan    ASA 3     general     intravenous and inhalational induction     Anesthetic plan, risks, benefits, and alternatives have been provided, discussed and informed consent has been obtained with: patient.    Use of blood products discussed with patient  Consented to blood products.    Plan discussed with CRNA.

## 2024-09-17 ENCOUNTER — OFFICE VISIT (OUTPATIENT)
Dept: GASTROENTEROLOGY | Facility: CLINIC | Age: 64
End: 2024-09-17
Payer: MEDICARE

## 2024-09-17 VITALS
DIASTOLIC BLOOD PRESSURE: 87 MMHG | SYSTOLIC BLOOD PRESSURE: 137 MMHG | HEIGHT: 62 IN | BODY MASS INDEX: 26.87 KG/M2 | HEART RATE: 84 BPM | WEIGHT: 146 LBS

## 2024-09-17 DIAGNOSIS — K21.9 GASTROESOPHAGEAL REFLUX DISEASE, UNSPECIFIED WHETHER ESOPHAGITIS PRESENT: Primary | ICD-10-CM

## 2024-09-17 DIAGNOSIS — K58.1 IRRITABLE BOWEL SYNDROME WITH CONSTIPATION: ICD-10-CM

## 2024-10-02 ENCOUNTER — HOSPITAL ENCOUNTER (OUTPATIENT)
Dept: ULTRASOUND IMAGING | Facility: HOSPITAL | Age: 64
Discharge: HOME | End: 2024-10-02
Payer: MEDICARE

## 2024-10-02 ENCOUNTER — HOSPITAL ENCOUNTER (OUTPATIENT)
Dept: MAMMOGRAPHY | Facility: HOSPITAL | Age: 64
Discharge: HOME | End: 2024-10-02
Payer: MEDICARE

## 2024-10-02 DIAGNOSIS — N64.4 BREAST PAIN: ICD-10-CM

## 2024-10-02 PROCEDURE — G0279 TOMOSYNTHESIS, MAMMO: HCPCS

## 2024-10-02 PROCEDURE — 76642 ULTRASOUND BREAST LIMITED: CPT

## 2024-10-02 PROCEDURE — 77066 DX MAMMO INCL CAD BI: CPT

## 2025-04-15 ENCOUNTER — TRANSCRIBE ORDERS (OUTPATIENT)
Dept: ADMINISTRATIVE | Facility: HOSPITAL | Age: 65
End: 2025-04-15
Payer: MEDICARE

## 2025-04-15 DIAGNOSIS — R10.84 ABDOMINAL PAIN, GENERALIZED: ICD-10-CM

## 2025-04-15 DIAGNOSIS — R11.10 CHRONIC VOMITING: Primary | ICD-10-CM

## 2025-04-25 ENCOUNTER — OFFICE VISIT (OUTPATIENT)
Dept: GASTROENTEROLOGY | Facility: CLINIC | Age: 65
End: 2025-04-25
Payer: MEDICARE

## 2025-04-25 VITALS
HEART RATE: 98 BPM | SYSTOLIC BLOOD PRESSURE: 131 MMHG | WEIGHT: 148 LBS | BODY MASS INDEX: 27.23 KG/M2 | DIASTOLIC BLOOD PRESSURE: 77 MMHG | HEIGHT: 62 IN

## 2025-04-25 DIAGNOSIS — K21.9 GASTROESOPHAGEAL REFLUX DISEASE, UNSPECIFIED WHETHER ESOPHAGITIS PRESENT: Primary | ICD-10-CM

## 2025-04-25 DIAGNOSIS — R11.2 NAUSEA AND VOMITING, UNSPECIFIED VOMITING TYPE: ICD-10-CM

## 2025-04-25 PROCEDURE — 83735 ASSAY OF MAGNESIUM: CPT

## 2025-04-25 RX ORDER — LANSOPRAZOLE 30 MG/1
30 CAPSULE, DELAYED RELEASE ORAL 2 TIMES DAILY
Qty: 60 CAPSULE | Refills: 5 | Status: SHIPPED | OUTPATIENT
Start: 2025-04-25

## 2025-04-25 NOTE — PROGRESS NOTES
Chief Complaint  Vomiting    Perfecto Dozier is a 65 y.o. female who presents today to [facility] for Vomiting.    HPI:   65-year-old female with PMH DM2 on Trulicity GERD who presents today for evaluation of abdominal pain and rectal bleeding. Patient reports that she has also noticed worsening constipation. States that she will typically go 4-5 days between bowel movements and when she does have a bowel movement they are typically small volume. She rates her BSS is 4. She endorses incomplete evacuation and excessive straining. She takes senna as needed but states that this does not help much. She has noted blood on the toilet paper when she wipes. States that she has right lower quadrant pain that radiates into her groin. Describes the pain as intermittent, sharp and occurring randomly. It does seem to be alleviated after bowel movements. She is often nauseated in the morning especially if she drinks a second cup of coffee. States that she will occasionally vomit but its mostly stomach acid. She reports flares once every other week on Protonix 40 mg once daily. She is s/p cholecystectomy. She had a colonoscopy performed in [id number]. Of note, she does have a sister who was diagnosed with colon cancer at age 64. She denies unintentional weight loss, nausea, vomiting, hematemesis, and melena.  Patient was initiated on Protonix.  She was started on Linzess 145 mcg capsules and was scheduled for colonoscopy.     Colonoscopy performed on 8/1/2024 by Dr. Hutton was notable for normal colon, internal hemorrhoids, otherwise unremarkable.       9/17/2024:  patient reports doing much better.  She reports that her acid reflux is well-controlled on Protonix 40 mg twice daily.  States having acid reflux once or twice each month.  She reports that her bowels move every other day and that her stool is soft.  She denies unintentional weight loss, nausea, vomiting, abdominal pain,  dysphagia, rectal pain, change in bowel habits, melena, and hematochezia.              Interval History:    Today, patient presents for follow-up.  Patient states that she has been vomiting in the mornings.  States that this happens approximately 3-4 times per week.  She initially thought that the nausea and vomiting was secondary to her morning medications.  Therefore, she has been eating prior to taking her medications.  She states that if she eats other medications or without her medications she still has ongoing nausea and vomiting.  She often vomits within minutes of taking medications or eating.  Vomitus consist mostly of yellow phlegm.  Patient was on Trulicity but this was discontinued 2 weeks ago.  Patient has not vomited since last week.  She reports that her GERD is well-controlled on Protonix 40 mg twice daily.  Patient notes no new abdominal pain.  Reports having regular bowel movements on Linzess 145 mcg.  Patient has recently started taking a magnesium supplement to help regulate bowel movements as well.  She denies unintentional weight loss, hematemesis, coffee-ground emesis, abdominal pain, melena, or hematochezia.     Previous History:   Past Medical History:   Diagnosis Date    Abdominal pain     Diabetes mellitus     Elevated cholesterol     GERD (gastroesophageal reflux disease)     Heartburn     Hypertension       Past Surgical History:   Procedure Laterality Date    ABDOMINAL SURGERY      CHOLECYSTECTOMY N/A 09/14/2018    Procedure: CHOLECYSTECTOMY LAPAROSCOPIC , LIVER BIOPSY.;  Surgeon: Dawson Weinstein MD;  Location: [facility] OR;  Service: General    COLONOSCOPY      12 years ago    COLONOSCOPY N/A 03/07/2019    Procedure: COLONOSCOPY;  Surgeon: Dawson Weinstein MD;  Location: [facility] OR;  Service: Gastroenterology    COLONOSCOPY N/A 08/01/2024    Procedure: COLONOSCOPY;  Surgeon: Candice Eller MD;  Location: [facility] OR;  Service: Gastroenterology;  Laterality: N/A;     "HYSTERECTOMY  01/2015    benign    MASS EXCISION       thyroid nodule many years ago     OOPHORECTOMY Bilateral 01/2015    Complete    THYROID SURGERY      TUBAL ABDOMINAL LIGATION        Social History     Socioeconomic History    Marital status:    Tobacco Use    Smoking status: Never    Smokeless tobacco: Never   Vaping Use    Vaping status: Never Used   Substance and Sexual Activity    Alcohol use: No    Drug use: No    Sexual activity: Defer     Birth control/protection: Post-menopausal, Surgical        Current Medications:  Current Outpatient Medications   Medication Sig Dispense Refill    Alogliptin-Metformin HCl 12.5-500 MG tablet Take  by mouth 2 (Two) Times a Day.      Canagliflozin (INVOKANA) 300 MG tablet Take 1 tablet by mouth Daily.      cetirizine (zyrTEC) 10 MG tablet Take 1 tablet by mouth Daily.      Cholecalciferol (VITAMIN D-3) 5000 units tablet Take 1 tablet by mouth 1 (One) Time Per Week.      escitalopram (LEXAPRO) 10 MG tablet Take 1 tablet by mouth Daily.      hydrOXYzine pamoate (VISTARIL) 25 MG capsule Take 1 capsule by mouth 3 (Three) Times a Day As Needed for Anxiety.      Jardiance 25 MG tablet tablet TAKE ONE TABLET BY MOUTH EVERY MORNING FOR SUGAR      linaclotide (Linzess) 145 MCG capsule capsule Take 1 capsule by mouth Daily. 30 minutes before meals on an empty stomach. 30 capsule 5    losartan (COZAAR) 50 MG tablet Take  by mouth Daily.      polyethylene glycol (MIRALAX) packet Take 17 g by mouth 2 (Two) Times a Day As Needed (constipation). 30 each 2    simvastatin (ZOCOR) 10 MG tablet Take  by mouth Daily.      Trulicity 3 MG/0.5ML solution pen-injector       lansoprazole (PREVACID) 30 MG capsule Take 1 capsule by mouth 2 (Two) Times a Day. 60 capsule 5     No current facility-administered medications for this visit.       Allergies:   Allergies   Allergen Reactions    Penicillins Hives and Swelling       Vitals:   /77   Pulse 98   Ht 157.5 cm (62\")   Wt 67.1 kg " "(148 lb)   LMP  (LMP Unknown)   BMI 27.07 kg/m²   Estimated body mass index is 27.07 kg/m² as calculated from the following:    Height as of this encounter: 157.5 cm (62\").    Weight as of this encounter: 67.1 kg (148 lb).    ROS:   Review of Systems   Constitutional: Negative.    HENT: Negative.     Respiratory: Negative.     Cardiovascular: Negative.    Gastrointestinal:  Positive for nausea and vomiting. Negative for abdominal distention, abdominal pain, anal bleeding, blood in stool, constipation, diarrhea and rectal pain.   All other systems reviewed and are negative.       Physical Exam:   Physical Exam  Vitals reviewed.   Constitutional:       General: She is not in acute distress.     Appearance: Normal appearance. She is well-groomed. She is not toxic-appearing.   HENT:      Head: Normocephalic and atraumatic.      Nose: Nose normal.      Mouth/Throat:      Mouth: Mucous membranes are moist.   Eyes:      Extraocular Movements: Extraocular movements intact.   Cardiovascular:      Rate and Rhythm: Normal rate and regular rhythm.      Heart sounds: Normal heart sounds. No murmur heard.  Pulmonary:      Effort: Pulmonary effort is normal. No respiratory distress.      Breath sounds: Normal breath sounds. No stridor. No wheezing or rales.   Abdominal:      General: Bowel sounds are normal. There is no distension.      Palpations: Abdomen is soft. There is no mass.      Tenderness: There is no abdominal tenderness. There is no guarding or rebound.      Hernia: No hernia is present.   Skin:     General: Skin is warm.      Coloration: Skin is not jaundiced.      Findings: No rash.   Neurological:      Mental Status: She is alert and oriented to person, place, and time.   Psychiatric:         Mood and Affect: Mood and affect normal.         Speech: Speech normal.         Behavior: Behavior normal. Behavior is cooperative.         Thought Content: Thought content normal.          Lab Results:   Lab Results "   Component Value Date    WBC 7.4 03/25/2025    HGB 11.7 03/25/2025    HCT 38.1 03/25/2025    MCV 75 (L) 03/25/2025    RDW 15.7 (H) 03/25/2025     03/25/2025    NEUTRORELPCT 67 03/25/2025    LYMPHORELPCT 22 03/25/2025    MONORELPCT 8 03/25/2025    EOSRELPCT 2 03/25/2025    BASORELPCT 1 03/25/2025    NEUTROABS 5 03/25/2025    LYMPHSABS 1.6 03/25/2025       Lab Results   Component Value Date     01/30/2019    K 3.9 01/30/2019    CO2 25.0 01/30/2019     01/30/2019    BUN 15 01/30/2019    CREATININE 0.64 01/30/2019    EGFRIFNONA 95 01/30/2019    GLUCOSE 110 01/30/2019    CALCIUM 10.1 (H) 01/30/2019    ALKPHOS 111 (H) 01/30/2019    AST 17 01/30/2019    ALT 19 01/30/2019    BILITOT 0.3 01/30/2019    ALBUMIN 4.70 01/30/2019    PROTEINTOT 7.5 01/30/2019    MG 2.3 04/25/2025       Pathology:        Endoscopy:        Imaging:   No Images in the past 120 days found..      Results review: During today's encounter, all relevant clinical data has been reviewed.      Assessment and Plan    1. Gastroesophageal reflux disease, unspecified whether esophagitis present (Primary)  2. Nausea and vomiting, unspecified vomiting type  Differential includes GLP induced N/V, gastroparesis, uncontrolled GERD, and hypermagnesia secondary to exogenous magnesium supplementation.  Will discontinue Protonix 40 mg twice daily.  Initiate Prevacid 30 mg twice daily.  -     lansoprazole (PREVACID) 30 MG capsule; Take 1 capsule by mouth 2 (Two) Times a Day.  Dispense: 60 capsule; Refill: 5  Obtain magnesium level.  -     Magnesium  Will obtain gastric emptying study as well.  -     NM Gastric Emptying; Future      New Medications:   New Medications Ordered This Visit   Medications    lansoprazole (PREVACID) 30 MG capsule     Sig: Take 1 capsule by mouth 2 (Two) Times a Day.     Dispense:  60 capsule     Refill:  5       Discontinued Medications:   Medications Discontinued During This Encounter   Medication Reason    pantoprazole  (Protonix) 40 MG EC tablet Not Efficacious        Visit Diagnoses:    ICD-10-CM ICD-9-CM   1. Gastroesophageal reflux disease, unspecified whether esophagitis present  K21.9 530.81   2. Nausea and vomiting, unspecified vomiting type  R11.2 787.01            Follow Up:   Return in about 8 weeks (around 6/20/2025).    The patient was in agreement with the plan and all questions were answered to patient's satisfaction.        This document has been electronically signed by Marsha Soto PA-C   April 30, 2025 13:16 EDT    Dictated Utilizing Dragon Dictation: Part of this note may be an electronic transcription/translation of spoken language to printed text using the Dragon Dictation System.    CC:  No ref. provider found  Heri Ramires MD

## 2025-04-26 LAB — MAGNESIUM SERPL-MCNC: 2.3 MG/DL (ref 1.6–2.4)

## 2025-04-30 ENCOUNTER — RESULTS FOLLOW-UP (OUTPATIENT)
Dept: GASTROENTEROLOGY | Facility: CLINIC | Age: 65
End: 2025-04-30
Payer: MEDICARE

## 2025-05-13 ENCOUNTER — HOSPITAL ENCOUNTER (OUTPATIENT)
Dept: CT IMAGING | Facility: HOSPITAL | Age: 65
Discharge: HOME | End: 2025-05-13
Payer: MEDICARE

## 2025-05-13 ENCOUNTER — HOSPITAL ENCOUNTER (OUTPATIENT)
Dept: NUCLEAR MEDICINE | Facility: HOSPITAL | Age: 65
Discharge: HOME | End: 2025-05-13
Payer: MEDICARE

## 2025-05-13 DIAGNOSIS — R11.10 CHRONIC VOMITING: ICD-10-CM

## 2025-05-13 DIAGNOSIS — K21.9 GASTROESOPHAGEAL REFLUX DISEASE, UNSPECIFIED WHETHER ESOPHAGITIS PRESENT: ICD-10-CM

## 2025-05-13 DIAGNOSIS — R10.84 ABDOMINAL PAIN, GENERALIZED: ICD-10-CM

## 2025-05-13 DIAGNOSIS — R11.2 NAUSEA AND VOMITING, UNSPECIFIED VOMITING TYPE: ICD-10-CM

## 2025-05-13 PROCEDURE — 25510000001 IOPAMIDOL 61 % SOLUTION: Performed by: INTERNAL MEDICINE

## 2025-05-13 PROCEDURE — 74178 CT ABD&PLV WO CNTR FLWD CNTR: CPT | Performed by: RADIOLOGY

## 2025-05-13 PROCEDURE — 74178 CT ABD&PLV WO CNTR FLWD CNTR: CPT

## 2025-05-13 PROCEDURE — 82565 ASSAY OF CREATININE: CPT

## 2025-05-13 RX ORDER — IOPAMIDOL 612 MG/ML
100 INJECTION, SOLUTION INTRAVASCULAR
Status: COMPLETED | OUTPATIENT
Start: 2025-05-13 | End: 2025-05-13

## 2025-05-13 RX ADMIN — IOPAMIDOL 100 ML: 612 INJECTION, SOLUTION INTRAVENOUS at 09:35

## 2025-05-14 LAB — CREAT BLDA-MCNC: 0.7 MG/DL (ref 0.6–1.3)

## 2025-05-30 ENCOUNTER — HOSPITAL ENCOUNTER (OUTPATIENT)
Dept: NUCLEAR MEDICINE | Facility: HOSPITAL | Age: 65
Discharge: HOME | End: 2025-05-30
Payer: MEDICARE

## 2025-05-30 PROCEDURE — A9541 TC99M SULFUR COLLOID: HCPCS

## 2025-05-30 PROCEDURE — 78264 GASTRIC EMPTYING IMG STUDY: CPT

## 2025-05-30 PROCEDURE — 34310000005 TECHNETIUM SULFUR COLLOID

## 2025-05-30 RX ADMIN — TECHNETIUM TC 99M SULFUR COLLOID 1 DOSE: KIT at 07:53

## 2025-07-09 ENCOUNTER — TELEPHONE (OUTPATIENT)
Dept: UROLOGY | Facility: CLINIC | Age: 65
End: 2025-07-09
Payer: MEDICARE

## 2025-07-09 ENCOUNTER — OFFICE VISIT (OUTPATIENT)
Dept: UROLOGY | Facility: CLINIC | Age: 65
End: 2025-07-09
Payer: MEDICARE

## 2025-07-09 ENCOUNTER — PATIENT ROUNDING (BHMG ONLY) (OUTPATIENT)
Dept: UROLOGY | Facility: CLINIC | Age: 65
End: 2025-07-09
Payer: MEDICARE

## 2025-07-09 VITALS
HEART RATE: 71 BPM | WEIGHT: 145.2 LBS | TEMPERATURE: 97.1 F | DIASTOLIC BLOOD PRESSURE: 82 MMHG | BODY MASS INDEX: 26.72 KG/M2 | SYSTOLIC BLOOD PRESSURE: 132 MMHG | OXYGEN SATURATION: 94 % | HEIGHT: 62 IN

## 2025-07-09 DIAGNOSIS — N13.30 HYDRONEPHROSIS, BILATERAL: Primary | ICD-10-CM

## 2025-07-09 DIAGNOSIS — G89.29 CHRONIC RIGHT UPPER QUADRANT PAIN: ICD-10-CM

## 2025-07-09 DIAGNOSIS — N28.1 PARAPELVIC RENAL CYST: ICD-10-CM

## 2025-07-09 DIAGNOSIS — R10.11 CHRONIC RIGHT UPPER QUADRANT PAIN: ICD-10-CM

## 2025-07-09 PROBLEM — I10 HTN (HYPERTENSION), BENIGN: Status: ACTIVE | Noted: 2025-07-09

## 2025-07-09 PROBLEM — E78.5 DYSLIPIDEMIA: Status: ACTIVE | Noted: 2025-07-09

## 2025-07-09 PROBLEM — E11.42 PERIPHERAL SENSORY NEUROPATHY DUE TO TYPE 2 DIABETES MELLITUS: Status: ACTIVE | Noted: 2019-01-18

## 2025-07-09 PROBLEM — E55.9 VITAMIN D DEFICIENCY: Status: ACTIVE | Noted: 2019-01-18

## 2025-07-09 PROBLEM — R79.89 ABNORMAL LFTS: Status: ACTIVE | Noted: 2025-07-09

## 2025-07-09 LAB
BILIRUB BLD-MCNC: NEGATIVE MG/DL
CLARITY, POC: CLEAR
COLOR UR: YELLOW
EXPIRATION DATE: ABNORMAL
GLUCOSE UR STRIP-MCNC: ABNORMAL MG/DL
KETONES UR QL: NEGATIVE
LEUKOCYTE EST, POC: NEGATIVE
Lab: ABNORMAL
NITRITE UR-MCNC: NEGATIVE MG/ML
PH UR: 7 [PH] (ref 5–8)
PROT UR STRIP-MCNC: NEGATIVE MG/DL
RBC # UR STRIP: NEGATIVE /UL
SP GR UR: 1.01 (ref 1–1.03)
UROBILINOGEN UR QL: ABNORMAL

## 2025-07-09 NOTE — PROGRESS NOTES
Office Visit     Patient: Perfecto Dozier 65 y.o. female   : 1960   MRN: [id number]      Patient or patient representative verbalized consent for the use of Ambient Listening during the visit with  SAMANTHA Luther for chart documentation. 2025  08:55 EDT     Chief Complaint   Patient presents with    Hydronephrosis    Establish Care     Some pain in right abdomen     Referring Provider: Heri Ramires MD    Primary Care Provider: Heri Ramires MD     History of Present Illness  The patient presents for evaluation of bilateral hydronephrosis and parapelvic cysts accompanied by best friend.    Right Upper Abdominal Pain  - Experienced since her laparoscopic cholecystectomy 4-5 years ago    No history of renal issues    First consultation with a urologist.  Unaware of history of parapelvic cysts or hydronephrosis.     Subjective   Review of System:   As noted in HPI.    Past Medical History:   Diagnosis Date    Abdominal pain     Diabetes mellitus     Elevated cholesterol     GERD (gastroesophageal reflux disease)     Heartburn     Hydronephrosis, bilateral 2025    Hypertension     Parapelvic renal cyst 2025     Past Surgical History:   Procedure Laterality Date    ABDOMINAL SURGERY      CHOLECYSTECTOMY N/A 2018    Procedure: CHOLECYSTECTOMY LAPAROSCOPIC , LIVER BIOPSY.;  Surgeon: Dawson Weinstein MD;  Location: [facility] OR;  Service: General    COLONOSCOPY      12 years ago    COLONOSCOPY N/A 2019    Procedure: COLONOSCOPY;  Surgeon: Dawson Weinstein MD;  Location: [facility] OR;  Service: Gastroenterology    COLONOSCOPY N/A 2024    Procedure: COLONOSCOPY;  Surgeon: Candice Eller MD;  Location: [facility] OR;  Service: Gastroenterology;  Laterality: N/A;    HYSTERECTOMY  2015    benign    MASS EXCISION       thyroid nodule many years ago     OOPHORECTOMY Bilateral 2015    Complete    THYROID SURGERY      TUBAL ABDOMINAL LIGATION       Family  "History   Problem Relation Age of Onset    Breast cancer Mother 58    Cancer Mother     Breast cancer Maternal Aunt 62    Ovarian cancer Neg Hx      Social History     Socioeconomic History    Marital status:    Tobacco Use    Smoking status: Never     Passive exposure: Never    Smokeless tobacco: Never   Vaping Use    Vaping status: Never Used   Substance and Sexual Activity    Alcohol use: No    Drug use: No    Sexual activity: Not Currently     Birth control/protection: Post-menopausal, Surgical       Current Outpatient Medications:     Alogliptin-Metformin HCl 12.5-500 MG tablet, Take  by mouth 2 (Two) Times a Day., Disp: , Rfl:     Cholecalciferol (VITAMIN D-3) 5000 units tablet, Take 1 tablet by mouth 1 (One) Time Per Week., Disp: , Rfl:     Jardiance 25 MG tablet tablet, TAKE ONE TABLET BY MOUTH EVERY MORNING FOR SUGAR, Disp: , Rfl:     lansoprazole (PREVACID) 30 MG capsule, Take 1 capsule by mouth 2 (Two) Times a Day., Disp: 60 capsule, Rfl: 5    losartan (COZAAR) 50 MG tablet, Take  by mouth Daily., Disp: , Rfl:     simvastatin (ZOCOR) 10 MG tablet, Take  by mouth Daily., Disp: , Rfl:     Allergies   Allergen Reactions    Penicillins Hives and Swelling     Objective   Visit Vitals  /82   Pulse 71   Temp 97.1 °F (36.2 °C) (Temporal)   Ht 157.5 cm (62\")   Wt 65.9 kg (145 lb 3.2 oz)   LMP  (LMP Unknown)   SpO2 94%   BMI 26.56 kg/m²        Body mass index is 26.56 kg/m².     Physical Exam  Vitals and nursing note reviewed.   Constitutional:       General: She is awake. She is not in acute distress.  Pulmonary:      Effort: Pulmonary effort is normal.   Neurological:      Mental Status: She is alert and oriented to person, place, and time. Mental status is at baseline.   Psychiatric:         Mood and Affect: Mood normal.         Behavior: Behavior is cooperative.          Labs  Lab Results   Component Value Date    COLORU Yellow 07/09/2025    CLARITYU Clear 07/09/2025    SPECGRAV 1.015 07/09/2025    " "PHUR 7.0 07/09/2025    LEUKOCYTESUR Negative 07/09/2025    NITRITE Negative 07/09/2025    PROTEINPOCUA Negative 07/09/2025    GLUCOSEUR >=1000 mg/dL (3+) (A) 07/09/2025    KETONESU Negative 07/09/2025    UROBILINOGEN 0.2 E.U./dL 07/09/2025    BILIRUBINUR Negative 07/09/2025    RBCUR Negative 07/09/2025      Lab Results   Component Value Date    WBCUA 31-50 (A) 10/06/2018    RBCUA 0-2 10/06/2018    BACTERIA 4+ (A) 10/06/2018    HYALCASTU 3-6 10/06/2018    SQUAMEPIUA 3-6 (A) 10/06/2018        Lab Results   Component Value Date    WBC 7.4 03/25/2025    HGB 11.7 03/25/2025    HCT 38.1 03/25/2025    MCV 75 (L) 03/25/2025     03/25/2025     Lab Results   Component Value Date    GLUCOSE 110 01/30/2019    CALCIUM 10.1 (H) 01/30/2019     01/30/2019    K 3.9 01/30/2019    CO2 25.0 01/30/2019     01/30/2019    BUN 15 01/30/2019    BUN 12 10/06/2018    CREATININE 0.70 05/13/2025    CREATININE 0.64 01/30/2019    EGFR 124 08/03/2015    EGFR 91 02/05/2015    BCR 23.4 01/30/2019    ANIONGAP 8.0 01/30/2019    ALT 19 01/30/2019    AST 17 01/30/2019       Lab Results   Component Value Date    HGBA1C 7.1 (H) 03/25/2025        No results found for: \"URICACIDSTN\", \"HAGE1HLROUH\", \"EQOF3IJNSY\", \"LABMAGN\", \"CAPHOSSTONE\", \"HYDROXYAPATI\"  Lab Results   Component Value Date    HROJ64MY 79.0 08/09/2018     No results found for: \"CYSTINE\", \"URINEVOLUM\", \"CALCIUMUR\", \"OXALATEU\", \"CITRATEUR\", \"LABPH\", \"URICUR\", \"NAUR\", \"KUR\", \"MAGNESIUMUR\", \"PHOSUR\", \"AMMONIUMUR\", \"CLUR\", \"ULBPOTKFO40X\", \"UREAUR\", \"LABCREAUR\", \"CAOXSAT\", \"PROTEINCATRT\"    No results found for: \"ATOPOBIUMV\", \"BVAB2\", \"MEGASPHAER\", \"CALBICANSN\", \"CGLABRATAN\", \"CPARAPSILOS\", \"CLUSITANIAE\", \"CKRUSEI\", \"TRICHVAG\", \"CHLAMNAA\", \"NGONORRHON\", \"UREAPLASMA\", \"MYCOPLASMAG\"    Lab Results   Component Value Date    TSH 1.521 08/03/2015    JAPCCIRA46 1,140 (H) 02/29/2016    LXIK49KP 79.0 08/09/2018         Radiographic Studies  CT Abdomen Pelvis With & Without " Contrast  Result Date: 5/13/2025   1. Dilatation of the right and left renal pelvis. Ureter is not dilated. Probably represents UPJ obstruction.  2. Other findings as discussed above.          This report was finalized on 5/13/2025 9:53 AM by Dr. Alphonso Jo MD.        I have reviewed the above imaging.   I have reviewed the above labs.      PVR  Post-void residual performed with ultrasound by staff and interpreted by me - 0 mL    Assessment / Plan      Diagnoses and all orders for this visit:    1. Hydronephrosis, bilateral (Primary)  -     POC Urinalysis Dipstick, Automated  -     NM Renal With Flow & Function With Pharmacological Intervention; Future    2. Chronic right upper quadrant pain    3. Parapelvic renal cyst         Assessment & Plan  Hydronephrosis  - Bilateral hydronephrosis with right-sided predominance, likely due to UPJ obstruction as ureter is not dilated vs parapelvic cysts.  - Normal kidney function (Creatinine: 0.67, GFR: 96)  - Noted on CT since 2018  - No retention with PVR 0 mL  - Order nuclear medicine scan with diuretic (Lasix) to assess severity and cause  - Perform scan at [facility]  - Refer to urology specialists in [place] for further evaluation  - Potential interventions: monitoring, stent placement, or surgical correction  - Stent placement is outpatient with anesthesia, exchanged every 4-6 months  - Surgical correction preferred to avoid stent discomfort and repeated procedures  - Radiology department will schedule scan  - Urology office in [place] will schedule appointment       Return for Referral to [facility] to discuss NM lasix renogram .    Melissa Shahid, MSN, APRN, FNP-C  [facility]

## 2025-07-10 NOTE — TELEPHONE ENCOUNTER
PT CALLED TO CHECK ON REFERRAL TO [facility] OFFICE WITH NOEL.  CHART NOTE SAID OK TO SCHEDULE.  PT SCHEDULED WITH NOEL ON 8/7/25 at 8 AM.   PT ASKED IF SHE NEEDED TO HAVE A TEST REDONE PRIOR TO APPT.  PLEASE ADVICE PT ID TESTING NEEDED PRIOR TO APPT.  THANKS.

## 2025-07-13 NOTE — PROGRESS NOTES
July 13, 2025    Hello, may I speak with Perfecto Dozier?    My name is Allyn.      I am  with [facility]  [address]  [address]  [facility] [address] [phone].    Before we get started may I verify your date of birth? 1960    I am calling to officially welcome you to our practice and ask about your recent visit. Is this a good time to talk? Left pt a voicemail, welcoming her to the practice with my information and direct line.    Tell me about your visit with us. What things went well?         We're always looking for ways to make our patients' experiences even better. Do you have recommendations on ways we may improve?      Overall were you satisfied with your first visit to our practice?        I appreciate you taking the time to speak with me today. Is there anything else I can do for you?       Thank you, and have a great day.

## 2025-07-23 ENCOUNTER — HOSPITAL ENCOUNTER (OUTPATIENT)
Dept: NUCLEAR MEDICINE | Facility: HOSPITAL | Age: 65
Discharge: HOME | End: 2025-07-23
Admitting: NURSE PRACTITIONER
Payer: MEDICARE

## 2025-07-23 DIAGNOSIS — N13.30 HYDRONEPHROSIS, BILATERAL: ICD-10-CM

## 2025-07-23 PROCEDURE — 78708 K FLOW/FUNCT IMAGE W/DRUG: CPT

## 2025-07-23 PROCEDURE — 34310000005 TECHNETIUM MERTIATIDE: Performed by: NURSE PRACTITIONER

## 2025-07-23 PROCEDURE — 25010000002 FUROSEMIDE PER 20 MG: Performed by: NURSE PRACTITIONER

## 2025-07-23 PROCEDURE — A9562 TC99M MERTIATIDE: HCPCS | Performed by: NURSE PRACTITIONER

## 2025-07-23 RX ORDER — FUROSEMIDE 10 MG/ML
20 INJECTION INTRAMUSCULAR; INTRAVENOUS
Status: COMPLETED | OUTPATIENT
Start: 2025-07-23 | End: 2025-07-23

## 2025-07-23 RX ADMIN — TECHNESCAN TC 99M MERTIATIDE 1 DOSE: 1 INJECTION, POWDER, LYOPHILIZED, FOR SOLUTION INTRAVENOUS at 11:38

## 2025-07-23 RX ADMIN — FUROSEMIDE 20 MG: 10 INJECTION, SOLUTION INTRAMUSCULAR; INTRAVENOUS at 11:48

## 2025-07-28 RX ORDER — PANTOPRAZOLE SODIUM 40 MG/1
TABLET, DELAYED RELEASE ORAL
Qty: 180 TABLET | Refills: 0 | OUTPATIENT
Start: 2025-07-28

## 2025-08-04 RX ORDER — PANTOPRAZOLE SODIUM 40 MG/1
TABLET, DELAYED RELEASE ORAL
Qty: 180 TABLET | Refills: 0 | OUTPATIENT
Start: 2025-08-04

## 2025-08-07 ENCOUNTER — OFFICE VISIT (OUTPATIENT)
Dept: UROLOGY | Facility: CLINIC | Age: 65
End: 2025-08-07
Payer: MEDICARE

## 2025-08-07 DIAGNOSIS — N28.1 PARAPELVIC RENAL CYST: Primary | ICD-10-CM

## 2025-08-07 RX ORDER — PANTOPRAZOLE SODIUM 40 MG
40 TABLET, DELAYED RELEASE (ENTERIC COATED) ORAL DAILY
COMMUNITY
Start: 2025-07-29

## (undated) DEVICE — Device: Brand: DEFENDO AIR/WATER/SUCTION AND BIOPSY VALVE

## (undated) DEVICE — SKIN AFFIX SURG ADHESIVE 72/CS 0.55ML: Brand: MEDLINE

## (undated) DEVICE — MAX-CORE® DISPOSABLE CORE BIOPSY INSTRUMENT, 18G X 20CM: Brand: MAX-CORE

## (undated) DEVICE — ENDOPATH XCEL BLADELESS TROCARS WITH STABILITY SLEEVES: Brand: ENDOPATH XCEL

## (undated) DEVICE — TUBING, SUCTION, 1/4" X 20', STRAIGHT: Brand: MEDLINE INDUSTRIES, INC.

## (undated) DEVICE — HOLDER: Brand: DEROYAL

## (undated) DEVICE — ENDOGATOR TUBING FOR ENDOGATOR EGP-100 IRRIGATION PUMP,OLYMPUS OFP PUMP, OLYMPUS AFU-100 PUMP AND ERBE EIP2 PUMP: Brand: ENDOGATOR

## (undated) DEVICE — SUT MNCRYL 4/0 PS2 18 IN

## (undated) DEVICE — ENCORE® LATEX MICRO SIZE 7.5, STERILE LATEX POWDER-FREE SURGICAL GLOVE: Brand: ENCORE

## (undated) DEVICE — INSUFFLATION NEEDLE TO ESTABLISH PNEUMOPERITONEUM.: Brand: INSUFFLATION NEEDLE

## (undated) DEVICE — ENDOGATOR AUXILIARY WATER JET CONNECTOR: Brand: ENDOGATOR

## (undated) DEVICE — SYR LUERLOK 30CC

## (undated) DEVICE — CONN Y IRR DISP 1P/U

## (undated) DEVICE — APPL CHLORAPREP W/TINT 26ML ORNG

## (undated) DEVICE — [HIGH FLOW INSUFFLATOR,  DO NOT USE IF PACKAGE IS DAMAGED,  KEEP DRY,  KEEP AWAY FROM SUNLIGHT,  PROTECT FROM HEAT AND RADIOACTIVE SOURCES.]: Brand: PNEUMOSURE

## (undated) DEVICE — Device

## (undated) DEVICE — ENDOPATH XCEL UNIVERSAL TROCAR STABLILITY SLEEVES: Brand: ENDOPATH XCEL

## (undated) DEVICE — PAD GRND REM POLYHESIVE A/ DISP

## (undated) DEVICE — SUCTION CANISTER, 1500CC, RIGID: Brand: DEROYAL

## (undated) DEVICE — SYRINGE 10CC LUER LOCK: Brand: CARDINAL HEALTH

## (undated) DEVICE — COR LAP CHOLE: Brand: MEDLINE INDUSTRIES, INC.

## (undated) DEVICE — GLV SURG SENSICARE MICRO PF LF 8 STRL

## (undated) DEVICE — ENDOCUT SCISSOR TIP, DISPOSABLE: Brand: RENEW

## (undated) DEVICE — SINGLE PORT MANIFOLD: Brand: NEPTUNE 2

## (undated) DEVICE — GOWN,REINF,POLY,ECL,PP SLV,XL: Brand: MEDLINE

## (undated) DEVICE — ENDOPOUCH RETRIEVER SPECIMEN RETRIEVAL BAGS: Brand: ENDOPOUCH RETRIEVER

## (undated) DEVICE — SUT VIC 0/0 UR6 27IN DYED J603H